# Patient Record
Sex: FEMALE | Race: WHITE | NOT HISPANIC OR LATINO | Employment: PART TIME | URBAN - METROPOLITAN AREA
[De-identification: names, ages, dates, MRNs, and addresses within clinical notes are randomized per-mention and may not be internally consistent; named-entity substitution may affect disease eponyms.]

---

## 2018-05-01 ASSESSMENT — MIFFLIN-ST. JEOR: SCORE: 1190.25

## 2018-05-03 ENCOUNTER — ANESTHESIA - HEALTHEAST (OUTPATIENT)
Dept: SURGERY | Facility: AMBULATORY SURGERY CENTER | Age: 38
End: 2018-05-03

## 2018-05-04 ENCOUNTER — SURGERY - HEALTHEAST (OUTPATIENT)
Dept: SURGERY | Facility: AMBULATORY SURGERY CENTER | Age: 38
End: 2018-05-04

## 2019-06-13 ENCOUNTER — HOSPITAL ENCOUNTER (OUTPATIENT)
Dept: GENERAL RADIOLOGY | Facility: CLINIC | Age: 39
Discharge: HOME | End: 2019-06-13
Attending: OBSTETRICS & GYNECOLOGY | Admitting: OBSTETRICS & GYNECOLOGY
Payer: COMMERCIAL

## 2019-06-13 DIAGNOSIS — Z31.49 INVESTIGATION AND TESTING FOR PROCREATION MANAGEMENT: ICD-10-CM

## 2019-06-13 PROCEDURE — 25500064 ZZH RX 255 OP 636: Performed by: RADIOLOGY

## 2019-06-13 PROCEDURE — 74740 X-RAY FEMALE GENITAL TRACT: CPT

## 2019-06-13 RX ORDER — IOPAMIDOL 510 MG/ML
50 INJECTION, SOLUTION INTRAVASCULAR ONCE
Status: COMPLETED | OUTPATIENT
Start: 2019-06-13 | End: 2019-06-13

## 2019-06-13 RX ADMIN — IOPAMIDOL 5 ML: 510 INJECTION, SOLUTION INTRAVASCULAR at 08:03

## 2019-09-16 ENCOUNTER — INFUSION THERAPY VISIT (OUTPATIENT)
Dept: INFUSION THERAPY | Facility: CLINIC | Age: 39
End: 2019-09-16
Attending: OBSTETRICS & GYNECOLOGY
Payer: COMMERCIAL

## 2019-09-16 VITALS
SYSTOLIC BLOOD PRESSURE: 117 MMHG | RESPIRATION RATE: 16 BRPM | DIASTOLIC BLOOD PRESSURE: 68 MMHG | TEMPERATURE: 97.9 F | HEART RATE: 52 BPM

## 2019-09-16 DIAGNOSIS — O21.1 HYPEREMESIS GRAVIDARUM WITH DEHYDRATION: ICD-10-CM

## 2019-09-16 DIAGNOSIS — O21.1 HYPEREMESIS GRAVIDARUM WITH DEHYDRATION: Primary | ICD-10-CM

## 2019-09-16 PROCEDURE — 96374 THER/PROPH/DIAG INJ IV PUSH: CPT

## 2019-09-16 PROCEDURE — 25800030 ZZH RX IP 258 OP 636: Performed by: OBSTETRICS & GYNECOLOGY

## 2019-09-16 PROCEDURE — 96361 HYDRATE IV INFUSION ADD-ON: CPT

## 2019-09-16 PROCEDURE — 96375 TX/PRO/DX INJ NEW DRUG ADDON: CPT

## 2019-09-16 PROCEDURE — 25000128 H RX IP 250 OP 636: Performed by: OBSTETRICS & GYNECOLOGY

## 2019-09-16 RX ORDER — METOCLOPRAMIDE HYDROCHLORIDE 5 MG/ML
10 INJECTION INTRAMUSCULAR; INTRAVENOUS ONCE
Status: COMPLETED | OUTPATIENT
Start: 2019-09-16 | End: 2019-09-16

## 2019-09-16 RX ORDER — METOCLOPRAMIDE HYDROCHLORIDE 5 MG/ML
10 INJECTION INTRAMUSCULAR; INTRAVENOUS ONCE
Status: CANCELLED
Start: 2019-09-16

## 2019-09-16 RX ORDER — ONDANSETRON 4 MG/1
TABLET, FILM COATED ORAL EVERY 8 HOURS PRN
Status: ON HOLD | COMMUNITY
End: 2020-02-01

## 2019-09-16 RX ORDER — DEXTROSE, SODIUM CHLORIDE, SODIUM LACTATE, POTASSIUM CHLORIDE, AND CALCIUM CHLORIDE 5; .6; .31; .03; .02 G/100ML; G/100ML; G/100ML; G/100ML; G/100ML
1000 INJECTION, SOLUTION INTRAVENOUS ONCE
Status: CANCELLED | OUTPATIENT
Start: 2019-09-18

## 2019-09-16 RX ORDER — DEXTROSE, SODIUM CHLORIDE, SODIUM LACTATE, POTASSIUM CHLORIDE, AND CALCIUM CHLORIDE 5; .6; .31; .03; .02 G/100ML; G/100ML; G/100ML; G/100ML; G/100ML
1000 INJECTION, SOLUTION INTRAVENOUS ONCE
Status: CANCELLED | OUTPATIENT
Start: 2019-09-16

## 2019-09-16 RX ORDER — ONDANSETRON 2 MG/ML
4 INJECTION INTRAMUSCULAR; INTRAVENOUS
Status: COMPLETED | OUTPATIENT
Start: 2019-09-16 | End: 2019-09-16

## 2019-09-16 RX ORDER — ONDANSETRON 2 MG/ML
4 INJECTION INTRAMUSCULAR; INTRAVENOUS
Status: CANCELLED | OUTPATIENT
Start: 2019-09-18

## 2019-09-16 RX ORDER — DEXTROSE, SODIUM CHLORIDE, SODIUM LACTATE, POTASSIUM CHLORIDE, AND CALCIUM CHLORIDE 5; .6; .31; .03; .02 G/100ML; G/100ML; G/100ML; G/100ML; G/100ML
1000 INJECTION, SOLUTION INTRAVENOUS ONCE
Status: COMPLETED | OUTPATIENT
Start: 2019-09-16 | End: 2019-09-16

## 2019-09-16 RX ORDER — ONDANSETRON 2 MG/ML
4 INJECTION INTRAMUSCULAR; INTRAVENOUS
Status: CANCELLED | OUTPATIENT
Start: 2019-09-16

## 2019-09-16 RX ORDER — METOCLOPRAMIDE HYDROCHLORIDE 5 MG/ML
10 INJECTION INTRAMUSCULAR; INTRAVENOUS ONCE
Status: CANCELLED
Start: 2019-09-18

## 2019-09-16 RX ORDER — PRENATAL VIT/IRON FUM/FOLIC AC 27MG-0.8MG
1 TABLET ORAL DAILY
COMMUNITY
End: 2023-10-12

## 2019-09-16 RX ADMIN — SODIUM CHLORIDE, SODIUM LACTATE, POTASSIUM CHLORIDE, CALCIUM CHLORIDE AND DEXTROSE MONOHYDRATE 1000 ML: 5; 600; 310; 30; 20 INJECTION, SOLUTION INTRAVENOUS at 14:01

## 2019-09-16 RX ADMIN — METOCLOPRAMIDE 10 MG: 5 INJECTION, SOLUTION INTRAMUSCULAR; INTRAVENOUS at 13:16

## 2019-09-16 RX ADMIN — ONDANSETRON 4 MG: 2 INJECTION INTRAMUSCULAR; INTRAVENOUS at 14:14

## 2019-09-16 RX ADMIN — SODIUM CHLORIDE, POTASSIUM CHLORIDE, SODIUM LACTATE AND CALCIUM CHLORIDE 1000 ML: 600; 310; 30; 20 INJECTION, SOLUTION INTRAVENOUS at 12:59

## 2019-09-16 ASSESSMENT — PAIN SCALES - GENERAL: PAINLEVEL: NO PAIN (0)

## 2019-09-16 NOTE — PROGRESS NOTES
Infusion Nursing Note:  Brandy Daniel presents today for IVFs, anti-emetics.    Patient seen by provider today: Yes: OB   present during visit today: Not Applicable.    Note: Pt reports extreme nausea, pt is pregnant.  PT had reglan at 1316- some relief reported, pt requested zofran at 2pm due to persistent nausea.    Intravenous Access:  Peripheral IV placed.    Treatment Conditions:  Not Applicable.      Post Infusion Assessment:  Patient tolerated infusion without incident.  Blood return noted pre and post infusion.  Site patent and intact, free from redness, edema or discomfort.  No evidence of extravasations.  Access discontinued per protocol.       Discharge Plan:   Patient declined prescription refills.  Discharge instructions reviewed with: Patient.  Patient and/or family verbalized understanding of discharge instructions and all questions answered.  Copy of AVS reviewed with patient and/or family.  Patient will return 9/18/19 for next appointment.  Patient discharged in stable condition accompanied by: self.  Departure Mode: Ambulatory.    Jackelyn Beard, RN, RN

## 2019-09-18 ENCOUNTER — TELEPHONE (OUTPATIENT)
Dept: INFUSION THERAPY | Facility: CLINIC | Age: 39
End: 2019-09-18

## 2019-10-08 ENCOUNTER — AMBULATORY - HEALTHEAST (OUTPATIENT)
Dept: MATERNAL FETAL MEDICINE | Facility: HOSPITAL | Age: 39
End: 2019-10-08

## 2019-10-08 ENCOUNTER — MEDICAL CORRESPONDENCE (OUTPATIENT)
Dept: HEALTH INFORMATION MANAGEMENT | Facility: CLINIC | Age: 39
End: 2019-10-08

## 2019-10-08 DIAGNOSIS — O26.90 PREGNANCY, ANTEPARTUM, COMPLICATIONS: ICD-10-CM

## 2019-10-08 LAB
ABO + RH BLD: NORMAL
ABO + RH BLD: NORMAL
BLD GP AB SCN SERPL QL: NEGATIVE
C TRACH DNA SPEC QL PROBE+SIG AMP: NEGATIVE
HBV SURFACE AG SERPL QL IA: NEGATIVE
HIV 1+2 AB+HIV1 P24 AG SERPL QL IA: NON REACTIVE
N GONORRHOEA DNA SPEC QL PROBE+SIG AMP: NEGATIVE
RUBELLA ABY IGG: NORMAL
TREPONEMA ANTIBODIES: NORMAL

## 2019-10-11 ENCOUNTER — AMBULATORY - HEALTHEAST (OUTPATIENT)
Dept: MATERNAL FETAL MEDICINE | Facility: HOSPITAL | Age: 39
End: 2019-10-11

## 2019-10-18 ENCOUNTER — RECORDS - HEALTHEAST (OUTPATIENT)
Dept: ULTRASOUND IMAGING | Facility: HOSPITAL | Age: 39
End: 2019-10-18

## 2019-10-18 ENCOUNTER — AMBULATORY - HEALTHEAST (OUTPATIENT)
Dept: LAB | Facility: HOSPITAL | Age: 39
End: 2019-10-18

## 2019-10-18 ENCOUNTER — OFFICE VISIT - HEALTHEAST (OUTPATIENT)
Dept: MATERNAL FETAL MEDICINE | Facility: HOSPITAL | Age: 39
End: 2019-10-18

## 2019-10-18 ENCOUNTER — RECORDS - HEALTHEAST (OUTPATIENT)
Dept: ADMINISTRATIVE | Facility: OTHER | Age: 39
End: 2019-10-18

## 2019-10-18 DIAGNOSIS — O09.521 SUPERVISION OF ELDERLY MULTIGRAVIDA IN FIRST TRIMESTER: ICD-10-CM

## 2019-10-18 DIAGNOSIS — O26.90 PREGNANCY, ANTEPARTUM, COMPLICATIONS: ICD-10-CM

## 2019-10-18 DIAGNOSIS — O09.521 SUPERVISION OF ELDERLY MULTIGRAVIDA, FIRST TRIMESTER: ICD-10-CM

## 2019-10-18 DIAGNOSIS — O09.521 MULTIGRAVIDA OF ADVANCED MATERNAL AGE IN FIRST TRIMESTER: ICD-10-CM

## 2019-10-24 ENCOUNTER — COMMUNICATION - HEALTHEAST (OUTPATIENT)
Dept: MATERNAL FETAL MEDICINE | Facility: HOSPITAL | Age: 39
End: 2019-10-24

## 2019-10-24 LAB — TRISOMY 21,18,13 - HE HISTORICAL: NORMAL

## 2019-10-29 ENCOUNTER — AMBULATORY - HEALTHEAST (OUTPATIENT)
Dept: MATERNAL FETAL MEDICINE | Facility: HOSPITAL | Age: 39
End: 2019-10-29

## 2019-10-29 DIAGNOSIS — O09.529 ANTEPARTUM MULTIGRAVIDA OF ADVANCED MATERNAL AGE: ICD-10-CM

## 2019-10-29 DIAGNOSIS — O09.522 MULTIGRAVIDA OF ADVANCED MATERNAL AGE IN SECOND TRIMESTER: Primary | ICD-10-CM

## 2019-12-09 ENCOUNTER — RECORDS - HEALTHEAST (OUTPATIENT)
Dept: ULTRASOUND IMAGING | Facility: HOSPITAL | Age: 39
End: 2019-12-09

## 2019-12-09 ENCOUNTER — OFFICE VISIT - HEALTHEAST (OUTPATIENT)
Dept: MATERNAL FETAL MEDICINE | Facility: HOSPITAL | Age: 39
End: 2019-12-09

## 2019-12-09 ENCOUNTER — RECORDS - HEALTHEAST (OUTPATIENT)
Dept: ADMINISTRATIVE | Facility: OTHER | Age: 39
End: 2019-12-09

## 2019-12-09 DIAGNOSIS — O09.529 SUPERVISION OF ELDERLY MULTIGRAVIDA, UNSPECIFIED TRIMESTER: ICD-10-CM

## 2019-12-09 DIAGNOSIS — O09.522 MULTIGRAVIDA OF ADVANCED MATERNAL AGE IN SECOND TRIMESTER: ICD-10-CM

## 2020-02-01 ENCOUNTER — HOSPITAL ENCOUNTER (INPATIENT)
Facility: CLINIC | Age: 40
LOS: 2 days | Discharge: HOME | DRG: 833 | End: 2020-02-03
Attending: OBSTETRICS & GYNECOLOGY | Admitting: OBSTETRICS & GYNECOLOGY
Payer: COMMERCIAL

## 2020-02-01 PROBLEM — Z36.89 ENCOUNTER FOR TRIAGE IN PREGNANT PATIENT: Status: ACTIVE | Noted: 2020-02-01

## 2020-02-01 LAB
ALBUMIN UR-MCNC: NEGATIVE MG/DL
APPEARANCE UR: CLEAR
BILIRUB UR QL STRIP: NEGATIVE
COLOR UR AUTO: ABNORMAL
FIBRONECTIN FETAL VAG QL: NEGATIVE
GLUCOSE UR STRIP-MCNC: NEGATIVE MG/DL
HGB UR QL STRIP: NEGATIVE
KETONES UR STRIP-MCNC: 10 MG/DL
LEUKOCYTE ESTERASE UR QL STRIP: NEGATIVE
NITRATE UR QL: NEGATIVE
PH UR STRIP: 7 PH (ref 5–7)
RBC #/AREA URNS AUTO: <1 /HPF (ref 0–2)
SOURCE: ABNORMAL
SP GR UR STRIP: 1.01 (ref 1–1.03)
SQUAMOUS #/AREA URNS AUTO: 1 /HPF (ref 0–1)
UROBILINOGEN UR STRIP-MCNC: NORMAL MG/DL (ref 0–2)
WBC #/AREA URNS AUTO: <1 /HPF (ref 0–5)

## 2020-02-01 PROCEDURE — 82731 ASSAY OF FETAL FIBRONECTIN: CPT | Performed by: OBSTETRICS & GYNECOLOGY

## 2020-02-01 PROCEDURE — 25000132 ZZH RX MED GY IP 250 OP 250 PS 637

## 2020-02-01 PROCEDURE — G0463 HOSPITAL OUTPT CLINIC VISIT: HCPCS

## 2020-02-01 PROCEDURE — 12000000 ZZH R&B MED SURG/OB

## 2020-02-01 PROCEDURE — 25000132 ZZH RX MED GY IP 250 OP 250 PS 637: Performed by: OBSTETRICS & GYNECOLOGY

## 2020-02-01 PROCEDURE — 81001 URINALYSIS AUTO W/SCOPE: CPT | Performed by: OBSTETRICS & GYNECOLOGY

## 2020-02-01 RX ORDER — NIFEDIPINE 10 MG/1
CAPSULE ORAL
Status: DISCONTINUED
Start: 2020-02-01 | End: 2020-02-02 | Stop reason: HOSPADM

## 2020-02-01 RX ORDER — CALCIUM GLUCONATE 94 MG/ML
1 INJECTION, SOLUTION INTRAVENOUS
Status: DISCONTINUED | OUTPATIENT
Start: 2020-02-01 | End: 2020-02-03 | Stop reason: HOSPADM

## 2020-02-01 RX ORDER — NIFEDIPINE 10 MG/1
20 CAPSULE ORAL ONCE
Status: COMPLETED | OUTPATIENT
Start: 2020-02-01 | End: 2020-02-01

## 2020-02-01 RX ORDER — NIFEDIPINE 10 MG/1
CAPSULE ORAL
Status: COMPLETED
Start: 2020-02-01 | End: 2020-02-01

## 2020-02-01 RX ORDER — MAGNESIUM SULFATE HEPTAHYDRATE 40 MG/ML
4 INJECTION, SOLUTION INTRAVENOUS ONCE
Status: COMPLETED | OUTPATIENT
Start: 2020-02-01 | End: 2020-02-02

## 2020-02-01 RX ORDER — NIFEDIPINE 10 MG/1
20 CAPSULE ORAL EVERY 30 MIN PRN
Status: COMPLETED | OUTPATIENT
Start: 2020-02-01 | End: 2020-02-01

## 2020-02-01 RX ORDER — MAGNESIUM SULFATE IN WATER 40 MG/ML
2 INJECTION, SOLUTION INTRAVENOUS CONTINUOUS
Status: DISPENSED | OUTPATIENT
Start: 2020-02-02 | End: 2020-02-02

## 2020-02-01 RX ORDER — NIFEDIPINE 10 MG/1
20 CAPSULE ORAL EVERY 6 HOURS SCHEDULED
Status: DISCONTINUED | OUTPATIENT
Start: 2020-02-02 | End: 2020-02-01

## 2020-02-01 RX ORDER — BETAMETHASONE SODIUM PHOSPHATE AND BETAMETHASONE ACETATE 3; 3 MG/ML; MG/ML
12 INJECTION, SUSPENSION INTRA-ARTICULAR; INTRALESIONAL; INTRAMUSCULAR; SOFT TISSUE EVERY 24 HOURS
Status: COMPLETED | OUTPATIENT
Start: 2020-02-01 | End: 2020-02-02

## 2020-02-01 RX ADMIN — NIFEDIPINE 20 MG: 10 CAPSULE ORAL at 21:10

## 2020-02-01 RX ADMIN — NIFEDIPINE 20 MG: 10 CAPSULE ORAL at 23:05

## 2020-02-01 RX ADMIN — NIFEDIPINE 20 MG: 10 CAPSULE ORAL at 21:42

## 2020-02-01 ASSESSMENT — MIFFLIN-ST. JEOR: SCORE: 1177.58

## 2020-02-02 ENCOUNTER — APPOINTMENT (OUTPATIENT)
Dept: ULTRASOUND IMAGING | Facility: CLINIC | Age: 40
DRG: 833 | End: 2020-02-02
Attending: OBSTETRICS & GYNECOLOGY
Payer: COMMERCIAL

## 2020-02-02 VITALS
TEMPERATURE: 97.8 F | SYSTOLIC BLOOD PRESSURE: 113 MMHG | HEIGHT: 55 IN | DIASTOLIC BLOOD PRESSURE: 55 MMHG | RESPIRATION RATE: 16 BRPM | BODY MASS INDEX: 35.87 KG/M2 | WEIGHT: 155 LBS

## 2020-02-02 PROCEDURE — 76805 OB US >/= 14 WKS SNGL FETUS: CPT

## 2020-02-02 PROCEDURE — 25800030 ZZH RX IP 258 OP 636: Performed by: OBSTETRICS & GYNECOLOGY

## 2020-02-02 PROCEDURE — 25000132 ZZH RX MED GY IP 250 OP 250 PS 637: Performed by: OBSTETRICS & GYNECOLOGY

## 2020-02-02 PROCEDURE — 25000128 H RX IP 250 OP 636: Performed by: OBSTETRICS & GYNECOLOGY

## 2020-02-02 PROCEDURE — 12000035 ZZH R&B POSTPARTUM

## 2020-02-02 RX ORDER — SODIUM CHLORIDE, SODIUM LACTATE, POTASSIUM CHLORIDE, CALCIUM CHLORIDE 600; 310; 30; 20 MG/100ML; MG/100ML; MG/100ML; MG/100ML
INJECTION, SOLUTION INTRAVENOUS CONTINUOUS
Status: DISCONTINUED | OUTPATIENT
Start: 2020-02-02 | End: 2020-02-03 | Stop reason: HOSPADM

## 2020-02-02 RX ORDER — DIPHENHYDRAMINE HCL 25 MG
25 CAPSULE ORAL
Status: DISCONTINUED | OUTPATIENT
Start: 2020-02-02 | End: 2020-02-03 | Stop reason: HOSPADM

## 2020-02-02 RX ADMIN — MAGNESIUM SULFATE IN WATER 2 G/HR: 40 INJECTION, SOLUTION INTRAVENOUS at 00:53

## 2020-02-02 RX ADMIN — BETAMETHASONE SODIUM PHOSPHATE AND BETAMETHASONE ACETATE 12 MG: 3; 3 INJECTION, SUSPENSION INTRA-ARTICULAR; INTRALESIONAL; INTRAMUSCULAR at 23:50

## 2020-02-02 RX ADMIN — DIPHENHYDRAMINE HYDROCHLORIDE 25 MG: 25 CAPSULE ORAL at 01:32

## 2020-02-02 RX ADMIN — SODIUM CHLORIDE, POTASSIUM CHLORIDE, SODIUM LACTATE AND CALCIUM CHLORIDE: 600; 310; 30; 20 INJECTION, SOLUTION INTRAVENOUS at 11:26

## 2020-02-02 RX ADMIN — MAGNESIUM SULFATE HEPTAHYDRATE 4 G: 40 INJECTION, SOLUTION INTRAVENOUS at 00:18

## 2020-02-02 RX ADMIN — MAGNESIUM SULFATE IN WATER 2 G/HR: 40 INJECTION, SOLUTION INTRAVENOUS at 11:24

## 2020-02-02 RX ADMIN — BETAMETHASONE SODIUM PHOSPHATE AND BETAMETHASONE ACETATE 12 MG: 3; 3 INJECTION, SUSPENSION INTRA-ARTICULAR; INTRALESIONAL; INTRAMUSCULAR at 00:13

## 2020-02-02 NOTE — H&P
"Brandy Daniel is a 39 year old  @ 27w1d who presents with 3 days of lower back pain that felt musculoskeletal, then developed last night into contractions. While in triage, the contractions increased in frequency and intensity to every 2-3 minutes. She denies discharge, leaking of fluid, vaginal bleeding, fever, dysuria. She notes good fetal movement. She was started on nifedipine, received 3 doses, but noted contractions were persisting. The nifedipine was then discontinued and she was started on magnesium sulfate. Since starting magnesium, her contractions have become less intense and less frequent, although they have persisted every 6-7 minutes.    PMH: None  PSH: Breast augmentation, breast reduction  All: Cephalexin  OB: FT  x 2, new partner for this pregnancy  SH: NICU nurse at [employer]    PE: /56   Temp 97.9  F (36.6  C) (Temporal)   Resp 16   Ht 1.321 m (4' 4\")   Wt 70.3 kg (155 lb)   BMI 40.30 kg/m     NAD  Abd Soft, NT  Ext NT, no edema  SVE: Closed/long/firm/-4, posterior  FFN NEGATIVE  UA negative  [place]: Q6-7 minutes  FHT: 120s, mod variability, no decels, +accels    A/P: 39 year old  @ 27w1d with  contractions, negative FFN and no cervical change.   - s/p BMZ #1, will receive #2 tonight at 0000  - Will continue magnesium sulfate for 12 hours, discontinue at 2PM, then monitor contractions  - US growth and fluid this afternoon  - If stable tonight, she desires discharge to home after BMZ #2. Recommend f/u in 2 days in clinic.  "

## 2020-02-02 NOTE — PLAN OF CARE
Pt admitted to labor and delivery c/o  labor. Pt received 3 doses of procardia and started on Mag. Betamethasone x 1 dose. VSS on RA. C/o mild headache, hot flushes, using ice and fan for comfort. Benadryl given, but not able to sleep though the night. Ctx 2-6 min apart, and palpate mild. FHT category 1. Pt on bedrest with bathroom privileges. Voiding without difficulty. Significant other supportive at bedside. Pt anxious, restless, and verbalized to RN she suspects she will have post partum depression after this pregnancy. Will put in social work consult. Will continue to monitor per POC.

## 2020-02-02 NOTE — PROVIDER NOTIFICATION
02/01/20 2324   Provider Notification   Provider Name/Title Dr HECTOR Dorsey   Method of Notification Electronic Page   Request Evaluate - Remote   Notification Reason Other (Comment);SVE  (Text message)     Text message as follows:    Brandy Long thick & closed but soft.  Call if you want beta given. Isra

## 2020-02-02 NOTE — PLAN OF CARE
Pt called c/o moderate to strong ctx. Breathing through ctx, coached on slowing breathing. Pt states she feels her bladder is full. Pt encouraged to void. Pt encouraged to call if ctx still uncomfortable after emptying bladder. Will continue to monitor per POC.

## 2020-02-02 NOTE — PLAN OF CARE
Patient returns from ultrasound via wheelchair.  She notes feeling occasional cramps, denies them as painful.  She notes that her headache has been gone for hours, but she feels flushed in her face/head from the magnesium sulfate (ice pack helps).  She reports feeling tired.  She denies leaking fluid or vaginal bleeding.  She reports +fetal movement.      Will plan to update Dr. Dorsey around 1400 when magnesium sulfate is turned off.  Continuous external monitoring continues.  Pt orally hydrating and eating.  Her significant other has been supportive at her bedside.

## 2020-02-02 NOTE — PROVIDER NOTIFICATION
02/01/20 2300   Provider Notification   Provider Name/Title Dr HECTOR Dorsey   Method of Notification Phone   Request Evaluate - Remote   Notification Reason Uterine Activity     2300  Dr HCETOR Dorsey paged with return call received.  Update included but not limited to:  Contraction pattern and intensity as palpated by RN after two doses of nifedipine at 2110 & 2142.  Patient continues to accurately report frequency and duration of contractions.    Plan per provider administer third dose of 20 mg nifedipine now then start nifedipine 20 mg every 6 hours with first dose at 0400.  Check cervix if change then give betamethasone.

## 2020-02-02 NOTE — PLAN OF CARE
2343  External monitors off patient to room 230 via wheelchair.  Report to Shameka WANG RN care transferred.

## 2020-02-02 NOTE — PLAN OF CARE
2110  First dose of Nifedipine administered per orders.  Patient educated prior to administration.      2142  Second dose of nifedipine administered.  Contractions continue to palpate mild.      2300  Patient reports contractions had spaced out and felt less intense.  Now are increasing in intensity and frequency.  Continue to palpate mild.

## 2020-02-02 NOTE — PROVIDER NOTIFICATION
02/01/20 2102   Provider Notification   Provider Name/Title Dr HECTOR Dorsey   Method of Notification Phone   Request Evaluate - Remote   Notification Reason Uterine Activity;SVE;Other (Comment)  (FFN not resulted yet)     2102  Dr HECTOR Dorsey paged with return call received.  Update included but not limited to:  FFN not resulted yet, however cervix is long, thick, and closed.  Uterine contractions continuing every 2-3 minutes and palpating mild, patient now breathing with them.  Patient given marker and is marking beginning of contractions that is consistent with monitor and RNs palpation.      Plan per provider Nifedipine 20mg orally now may repeat twice for a total of 3 doses to stop contractions.  Check blood pressures per orders and notify per orders.

## 2020-02-02 NOTE — PROVIDER NOTIFICATION
02/01/20 2330   Provider Notification   Provider Name/Title Dr HECTOR Dorsey   Method of Notification Phone   Request Evaluate - Remote   Notification Reason Uterine Activity;SVE     2330  Dr HECTOR Dorsey paged with return call received.  Update included but not limited to:  Continued uterine activity that patient continues to feel, reviewed SVE.    Plan per provider start magnesium 4 gram load, 2 gram per hour.  Discontinue nifedipine.  Administer betamethasone.

## 2020-02-02 NOTE — PLAN OF CARE
1921  Patient arrived to maternal assessment center ambulatory with significant other, Donte.    Patient reports reason for visit is backache for 3 days, now abdominal cramping that has moved to possible contractions.  Patient to bathroom to void and leave urine sample then to MAC room to change into gown.      G 4 P 2     27 weeks 0 days gestation    Prenatal record reviewed.        Verbal consent for EFM.     EFM applied for fetal well being with uterine contractions.    Uterine assessment completed, non-tender and palpates soft between contractions.      Triage/Arrival assessment completed.     Patient reports fetal movement is present.  Patient denies pelvic pressure,backache, UTI symptoms, GI problems, bloody show, vaginal bleeding, edema, headache, visual disturbances, epigastric or URQ pain, and/or rupture of membranes.

## 2020-02-02 NOTE — PROVIDER NOTIFICATION
02/02/20 0600   Provider Notification   Provider Name/Title Dr HECTOR Dorsey   Method of Notification Electronic Page   Notification Reason Status Update   Dr HECTOR Dorsey returned phone call @ 0605. Update given on frequency of ctx. Pt feeling ctx and rating them stronger then before. New orders received: will continue to monitor pt for another hour, if ctx remain 3-5 min and getting stronger, will perform SVE. Will continue to monitor per POC.

## 2020-02-02 NOTE — PLAN OF CARE
Pt transferred to room 230 from MAC via wheel chair @ 2350. Oriented to unit, room and call light system. External toco and us applied with pt's verbal consent. VSS on RA ex tachycardic. Pt appears flushed, co mild headache. Reflexes +3, no clonus. IV placed and mag started.  POC reviewed and pt and support person, questions encouraged and answered, verbalize understanding. Will continue to monitor per POC.

## 2020-02-02 NOTE — PROVIDER NOTIFICATION
20   Provider Notification   Provider Name/Title Dr HECTOR Dorsey   Method of Notification Phone   Request Evaluate - Remote   Notification Reason Patient Arrived;Uterine Activity;Pain;Maternal Vital Sign Change;Other (Comment)  (UA result)       Dr HECTOR Dorsey paged with return call received.  Update included but not limited to:  Patient arrival.  Patient is a  27 0/7 gestation that presents complaining of abdominal cramping that has turned into contractions.  Patient rates abdominal cramping a 2-3 on 0-10 pain scale as uncomfortable tightening.  However backpain is an 8 when she moves.  Patient states she has never had aleja roberts contractions with her other two pregnancies (children are 8 and 10 years old.  Contractions via external tocotransducer and RN palpation every 2-3 minutes consistent with patient report of frequency and duration.  Contractions palpate mild by RN.   Fetal heart tones appropriate for gestation with a 140 baseline.  Maternal blood pressure on arrival 131/71. Came down to 128/65.  Noted BPs on prenatal 110s/60s.  No clonus normal reflexes.  Patient reports some increase in swelling at end of day.  UA collected and sent - abnormal result of ketones=10, otherwise specific Gravity = 1.006 and pH = 7.0.    Plan per provider Collect and send FFN and do SVE.  Notify with results.

## 2020-02-02 NOTE — PROVIDER NOTIFICATION
02/02/20 0125   Provider Notification   Provider Name/Title Dr HECTOR Dorsey   Method of Notification Electronic Page;Phone   Notification Reason Patient Request   Dr Dorsey paged per pt request for orders for sleeping pill. Orders for Benadryl received, see MAR.

## 2020-02-02 NOTE — PLAN OF CARE
2015  FFN collected.  SVE closed/thick/-4/posterior.      2030  Patient given marker.  Instructed to matthew when she starts to feel contraction.      2040  RN at bedside palpating contractions.  Contractions continue to palpate mild.  Patient marking every contraction palpated by RN and traced by external tocotransducer.

## 2020-02-02 NOTE — PLAN OF CARE
Magnesium Sulfate/Lactated Ringers stopped at 1403; IV saline locked.  Pt reports feeling 1 ctx per hour on average now; external monitoring shows the same. She denies any other changes.  She notes +FM.  Updated Dr. Dorsey with ultrasound results and pt's status/monitoring.  Ok to remove external fetal monitor; will obtain NST during the next shift.  Pt in agreement.      Pt plans to nap; she didn't sleep all night.  VSS.  Instructed pt to call when awake; pt in agreement.  Report given to JANELLE Sotelo at 1500.

## 2020-02-03 ENCOUNTER — HOSPITAL ENCOUNTER (INPATIENT)
Facility: CLINIC | Age: 40
End: 2020-02-03
Admitting: OBSTETRICS & GYNECOLOGY
Payer: COMMERCIAL

## 2020-02-03 NOTE — DISCHARGE INSTRUCTIONS
Discharge Instruction for Undelivered Patients      You were seen for:  labor evalutation  We Consulted: Dr. ASTRID Dorsey  You had (Test or Medicine):inpatient treatment for PTL,fetal and uterine monitoring, SVE, Nifedipine 3 doses then Magnesium Sulfate, Betamethasone 2 doses    Diet:   As tolerated  Drink 8 to 12 glasses of liquids (milk, juice, water) every day.  You may eat meals and snacks.     Activity:  Take it easy until seen by provider.  No heavy lifting or exercise.  Fetal kick counts once a day.  10 movements in 1 hour during the babies active time.     Call your provider if you notice:  Swelling in your face or increased swelling in your hands or legs.  Headaches that are not relieved by Tylenol (acetaminophen).  Changes in your vision (blurring: seeing spots or stars.)  Nausea (sick to your stomach) and vomiting (throwing up).   Weight gain of 5 pounds or more per week.  Heartburn that doesn't go away.  Signs of bladder infection: pain when you urinate (use the toilet), need to go more often and more urgently.  The bag of lopez (rupture of membranes) breaks, or you notice leaking in your underwear.  Bright red blood in your underwear.  Abdominal (lower belly) or stomach pain.  *If less than 34 weeks: Contractions (tightenings) more than 6 times in one hour.  Increase or change in vaginal discharge (note the color and amount)      Follow-up:  Follow up with provider on Tuesday in the office.

## 2020-02-03 NOTE — PLAN OF CARE
Betamethasone given, NST done, discharge instructions and document given. Follow up with provider in 2 days Patient left with good disposition

## 2020-02-03 NOTE — PLAN OF CARE
Pt's felt 4 contractions since the Magnesium sulfate was stopped.  Stated that it felt like a tightening.  Baby active.  Discussed staying hydrated and frequent voiding.  Reassurance given that the contractions that she had had when first admitted were not strong enough to change her cervix when D checked it today.

## 2020-02-06 ENCOUNTER — OFFICE VISIT (OUTPATIENT)
Dept: FAMILY MEDICINE | Facility: CLINIC | Age: 40
End: 2020-02-06
Payer: COMMERCIAL

## 2020-02-06 VITALS
WEIGHT: 159 LBS | BODY MASS INDEX: 41.34 KG/M2 | HEART RATE: 84 BPM | TEMPERATURE: 98.3 F | DIASTOLIC BLOOD PRESSURE: 60 MMHG | RESPIRATION RATE: 16 BRPM | OXYGEN SATURATION: 99 % | SYSTOLIC BLOOD PRESSURE: 112 MMHG

## 2020-02-06 DIAGNOSIS — J20.9 ACUTE BRONCHITIS WITH SYMPTOMS > 10 DAYS: Primary | ICD-10-CM

## 2020-02-06 DIAGNOSIS — Z33.1 PREGNANT STATE, INCIDENTAL: ICD-10-CM

## 2020-02-06 PROCEDURE — 99213 OFFICE O/P EST LOW 20 MIN: CPT | Performed by: FAMILY MEDICINE

## 2020-02-06 RX ORDER — AZITHROMYCIN 250 MG/1
TABLET, FILM COATED ORAL
Qty: 6 TABLET | Refills: 0 | Status: ON HOLD | OUTPATIENT
Start: 2020-02-06 | End: 2020-04-25

## 2020-02-06 NOTE — PROGRESS NOTES
Subjective     Brandy Daniel is a 39 year old female  at 27+ 5 weeks GA who presents to clinic today for the following health issues:    HPI   Acute Illness   Acute illness concerns: cough  Onset: x 11 days    Fever: no    Chills/Sweats: YES    Headache (location?): no    Sinus Pressure:YES    Conjunctivitis:  no    Ear Pain: no    Rhinorrhea: no    Congestion: YES    Sore Throat: no     Cough: YES - feels wet but not able to bring anythingup    Wheeze: no    Decreased Appetite: YES    Nausea: no    Vomiting: no    Diarrhea:  no    Dysuria/Freq.: no    Fatigue/Achiness: YES    Sick/Strep Exposure: YES- son had pneumonia - missed 2 weeks of school in early January     Therapies Tried and outcome:VIcks tylenol humidifier     Has had mild nasal/sinus congestion but more severe cough.  Cough is not improving, is keeping her up at night, is causing great fatigue.   She's discouraged because she can't take OTC cough medicine due to pregnancy.  She has been drinking warm water, taking honey, and treating with steam (long showers).    She's worried she may have pneumonia - possibly aspiration pneumonia as she has also been having severe GERD with current pregnancy.      BP Readings from Last 3 Encounters:   20 112/60   20 113/55   19 117/68    Wt Readings from Last 3 Encounters:   20 72.1 kg (159 lb)   20 70.3 kg (155 lb)   12 63.5 kg (140 lb)             Reviewed and updated as needed this visit by Provider  Meds  Problems         Review of Systems   ROS COMP: Constitutional, HEENT, cardiovascular, pulmonary, gi and gu systems are negative, except as otherwise noted.      Objective    /60 (BP Location: Left arm, Patient Position: Sitting, Cuff Size: Adult Regular)   Pulse 84   Temp 98.3  F (36.8  C) (Oral)   Resp 16   Wt 72.1 kg (159 lb)   SpO2 99%   Breastfeeding No   BMI 41.34 kg/m    Body mass index is 41.34 kg/m .  Physical Exam   GEN:  no apparent distress,  appears fatigued  EYES: sclerae and conjunctivae clear with no discharge  ENT: external ears and nose without lesions or scars, TM's and canals clear bilaterally and oropharynx clear with moist mucus membranes and normal landmarks  NECK:  Supple without adenopathy, mass, or thyromegaly  LUNGS:  normal respiratory effort, and lungs clear to auscultation bilaterally - no rales, rhonchi or wheezes  CV: regular rate and rhythm, normal S1 S2, no S3 or S4 and no murmur, click or rub   SKIN: Clear. No significant rash, abnormal pigmentation or lesions   ABD: gravid    Diagnostic Test Results:  Labs reviewed in Epic        Assessment & Plan       ICD-10-CM    1. Acute bronchitis with symptoms > 10 days J20.9 azithromycin (ZITHROMAX) 250 MG tablet   2. Pregnant state, incidental Z33.1      With no fever and normal lung exam pneumonia is unlikely.  I'd prefer to avoid CXR given her pregnancy status.  Given duration of symptoms I did offer her option of an empiric course of azithromycin and reviewed that this is pregnancy class B.  She was agreeable to the zpak.  She'll continue to avoid OTC cold/cough preparations - only acetaminophen.        No follow-ups on file.    eKri Dudley MD  [facility]

## 2020-02-12 ENCOUNTER — OFFICE VISIT - HEALTHEAST (OUTPATIENT)
Dept: FAMILY MEDICINE | Facility: CLINIC | Age: 40
End: 2020-02-12

## 2020-02-12 DIAGNOSIS — R07.89 CHEST WALL PAIN: ICD-10-CM

## 2020-04-10 LAB — GROUP B STREP PCR: NORMAL

## 2020-04-25 ENCOUNTER — HOSPITAL ENCOUNTER (INPATIENT)
Facility: CLINIC | Age: 40
LOS: 1 days | Discharge: HOME HEALTH | End: 2020-04-26
Attending: OBSTETRICS & GYNECOLOGY | Admitting: OBSTETRICS & GYNECOLOGY
Payer: COMMERCIAL

## 2020-04-25 ENCOUNTER — ANESTHESIA (OUTPATIENT)
Dept: OBGYN | Facility: CLINIC | Age: 40
End: 2020-04-25
Payer: COMMERCIAL

## 2020-04-25 ENCOUNTER — ANESTHESIA EVENT (OUTPATIENT)
Dept: OBGYN | Facility: CLINIC | Age: 40
End: 2020-04-25
Payer: COMMERCIAL

## 2020-04-25 PROBLEM — Z34.90 TERM PREGNANCY: Status: ACTIVE | Noted: 2020-04-25

## 2020-04-25 LAB
ABO + RH BLD: NORMAL
ABO + RH BLD: NORMAL
BASOPHILS # BLD AUTO: 0 10E9/L (ref 0–0.2)
BASOPHILS NFR BLD AUTO: 0.2 %
DIFFERENTIAL METHOD BLD: NORMAL
EOSINOPHIL # BLD AUTO: 0.1 10E9/L (ref 0–0.7)
EOSINOPHIL NFR BLD AUTO: 0.7 %
ERYTHROCYTE [DISTWIDTH] IN BLOOD BY AUTOMATED COUNT: 14.4 % (ref 10–15)
HCT VFR BLD AUTO: 36.5 % (ref 35–47)
HGB BLD-MCNC: 12 G/DL (ref 11.7–15.7)
IMM GRANULOCYTES # BLD: 0 10E9/L (ref 0–0.4)
IMM GRANULOCYTES NFR BLD: 0.4 %
LYMPHOCYTES # BLD AUTO: 2.9 10E9/L (ref 0.8–5.3)
LYMPHOCYTES NFR BLD AUTO: 30.2 %
MCH RBC QN AUTO: 28.6 PG (ref 26.5–33)
MCHC RBC AUTO-ENTMCNC: 32.9 G/DL (ref 31.5–36.5)
MCV RBC AUTO: 87 FL (ref 78–100)
MONOCYTES # BLD AUTO: 1.1 10E9/L (ref 0–1.3)
MONOCYTES NFR BLD AUTO: 11.6 %
NEUTROPHILS # BLD AUTO: 5.4 10E9/L (ref 1.6–8.3)
NEUTROPHILS NFR BLD AUTO: 56.9 %
NRBC # BLD AUTO: 0 10*3/UL
NRBC BLD AUTO-RTO: 0 /100
PLATELET # BLD AUTO: 203 10E9/L (ref 150–450)
RBC # BLD AUTO: 4.19 10E12/L (ref 3.8–5.2)
SARS-COV-2 PCR COMMENT: NORMAL
SARS-COV-2 RNA SPEC QL NAA+PROBE: NEGATIVE
SARS-COV-2 RNA SPEC QL NAA+PROBE: NORMAL
SPECIMEN EXP DATE BLD: NORMAL
SPECIMEN SOURCE: NORMAL
SPECIMEN SOURCE: NORMAL
WBC # BLD AUTO: 9.4 10E9/L (ref 4–11)

## 2020-04-25 PROCEDURE — 25000125 ZZHC RX 250: Performed by: SURGERY

## 2020-04-25 PROCEDURE — 37000011 ZZH ANESTHESIA WARD SERVICE

## 2020-04-25 PROCEDURE — 85025 COMPLETE CBC W/AUTO DIFF WBC: CPT | Performed by: OBSTETRICS & GYNECOLOGY

## 2020-04-25 PROCEDURE — 25000125 ZZHC RX 250: Performed by: OBSTETRICS & GYNECOLOGY

## 2020-04-25 PROCEDURE — 10907ZC DRAINAGE OF AMNIOTIC FLUID, THERAPEUTIC FROM PRODUCTS OF CONCEPTION, VIA NATURAL OR ARTIFICIAL OPENING: ICD-10-PCS | Performed by: OBSTETRICS & GYNECOLOGY

## 2020-04-25 PROCEDURE — 86901 BLOOD TYPING SEROLOGIC RH(D): CPT | Performed by: OBSTETRICS & GYNECOLOGY

## 2020-04-25 PROCEDURE — 25800030 ZZH RX IP 258 OP 636: Performed by: SURGERY

## 2020-04-25 PROCEDURE — 86900 BLOOD TYPING SEROLOGIC ABO: CPT | Performed by: OBSTETRICS & GYNECOLOGY

## 2020-04-25 PROCEDURE — 25800030 ZZH RX IP 258 OP 636: Performed by: OBSTETRICS & GYNECOLOGY

## 2020-04-25 PROCEDURE — 0HQ9XZZ REPAIR PERINEUM SKIN, EXTERNAL APPROACH: ICD-10-PCS | Performed by: OBSTETRICS & GYNECOLOGY

## 2020-04-25 PROCEDURE — 25000128 H RX IP 250 OP 636: Performed by: SURGERY

## 2020-04-25 PROCEDURE — 72200001 ZZH LABOR CARE VAGINAL DELIVERY SINGLE

## 2020-04-25 PROCEDURE — 36415 COLL VENOUS BLD VENIPUNCTURE: CPT | Performed by: OBSTETRICS & GYNECOLOGY

## 2020-04-25 PROCEDURE — 3E0R3BZ INTRODUCTION OF ANESTHETIC AGENT INTO SPINAL CANAL, PERCUTANEOUS APPROACH: ICD-10-PCS | Performed by: SURGERY

## 2020-04-25 PROCEDURE — 86780 TREPONEMA PALLIDUM: CPT | Performed by: OBSTETRICS & GYNECOLOGY

## 2020-04-25 PROCEDURE — 25000132 ZZH RX MED GY IP 250 OP 250 PS 637: Performed by: OBSTETRICS & GYNECOLOGY

## 2020-04-25 PROCEDURE — 12000035 ZZH R&B POSTPARTUM

## 2020-04-25 PROCEDURE — 87635 SARS-COV-2 COVID-19 AMP PRB: CPT | Performed by: OBSTETRICS & GYNECOLOGY

## 2020-04-25 PROCEDURE — 00HU33Z INSERTION OF INFUSION DEVICE INTO SPINAL CANAL, PERCUTANEOUS APPROACH: ICD-10-PCS | Performed by: SURGERY

## 2020-04-25 RX ORDER — VITAMIN B COMPLEX
50 TABLET ORAL DAILY
Status: DISCONTINUED | OUTPATIENT
Start: 2020-04-26 | End: 2020-04-26 | Stop reason: HOSPADM

## 2020-04-25 RX ORDER — OXYTOCIN/0.9 % SODIUM CHLORIDE 30/500 ML
100-340 PLASTIC BAG, INJECTION (ML) INTRAVENOUS CONTINUOUS PRN
Status: COMPLETED | OUTPATIENT
Start: 2020-04-25 | End: 2020-04-25

## 2020-04-25 RX ORDER — TRANEXAMIC ACID 10 MG/ML
1 INJECTION, SOLUTION INTRAVENOUS EVERY 30 MIN PRN
Status: DISCONTINUED | OUTPATIENT
Start: 2020-04-25 | End: 2020-04-26 | Stop reason: HOSPADM

## 2020-04-25 RX ORDER — NALOXONE HYDROCHLORIDE 0.4 MG/ML
.1-.4 INJECTION, SOLUTION INTRAMUSCULAR; INTRAVENOUS; SUBCUTANEOUS
Status: DISCONTINUED | OUTPATIENT
Start: 2020-04-25 | End: 2020-04-25

## 2020-04-25 RX ORDER — TRANEXAMIC ACID 10 MG/ML
1 INJECTION, SOLUTION INTRAVENOUS EVERY 30 MIN PRN
Status: DISCONTINUED | OUTPATIENT
Start: 2020-04-25 | End: 2020-04-25

## 2020-04-25 RX ORDER — ACETAMINOPHEN 325 MG/1
650 TABLET ORAL EVERY 4 HOURS PRN
Status: DISCONTINUED | OUTPATIENT
Start: 2020-04-25 | End: 2020-04-26 | Stop reason: HOSPADM

## 2020-04-25 RX ORDER — OXYTOCIN 10 [USP'U]/ML
10 INJECTION, SOLUTION INTRAMUSCULAR; INTRAVENOUS
Status: DISCONTINUED | OUTPATIENT
Start: 2020-04-25 | End: 2020-04-25

## 2020-04-25 RX ORDER — ONDANSETRON 2 MG/ML
4 INJECTION INTRAMUSCULAR; INTRAVENOUS EVERY 6 HOURS PRN
Status: DISCONTINUED | OUTPATIENT
Start: 2020-04-25 | End: 2020-04-25

## 2020-04-25 RX ORDER — OXYTOCIN 10 [USP'U]/ML
10 INJECTION, SOLUTION INTRAMUSCULAR; INTRAVENOUS
Status: DISCONTINUED | OUTPATIENT
Start: 2020-04-25 | End: 2020-04-26 | Stop reason: HOSPADM

## 2020-04-25 RX ORDER — ROPIVACAINE HYDROCHLORIDE 2 MG/ML
10 INJECTION, SOLUTION EPIDURAL; INFILTRATION; PERINEURAL ONCE
Status: COMPLETED | OUTPATIENT
Start: 2020-04-25 | End: 2020-04-25

## 2020-04-25 RX ORDER — OXYCODONE AND ACETAMINOPHEN 5; 325 MG/1; MG/1
1 TABLET ORAL
Status: DISCONTINUED | OUTPATIENT
Start: 2020-04-25 | End: 2020-04-25

## 2020-04-25 RX ORDER — MODIFIED LANOLIN
OINTMENT (GRAM) TOPICAL
Status: DISCONTINUED | OUTPATIENT
Start: 2020-04-25 | End: 2020-04-26 | Stop reason: HOSPADM

## 2020-04-25 RX ORDER — CARBOPROST TROMETHAMINE 250 UG/ML
250 INJECTION, SOLUTION INTRAMUSCULAR
Status: DISCONTINUED | OUTPATIENT
Start: 2020-04-25 | End: 2020-04-25

## 2020-04-25 RX ORDER — ONDANSETRON 4 MG/1
4 TABLET, ORALLY DISINTEGRATING ORAL EVERY 6 HOURS PRN
Status: DISCONTINUED | OUTPATIENT
Start: 2020-04-25 | End: 2020-04-25

## 2020-04-25 RX ORDER — EPHEDRINE SULFATE 50 MG/ML
5 INJECTION, SOLUTION INTRAMUSCULAR; INTRAVENOUS; SUBCUTANEOUS
Status: DISCONTINUED | OUTPATIENT
Start: 2020-04-25 | End: 2020-04-25

## 2020-04-25 RX ORDER — HYDROCORTISONE 2.5 %
CREAM (GRAM) TOPICAL 3 TIMES DAILY PRN
Status: DISCONTINUED | OUTPATIENT
Start: 2020-04-25 | End: 2020-04-26 | Stop reason: HOSPADM

## 2020-04-25 RX ORDER — CLINDAMYCIN PHOSPHATE 900 MG/50ML
900 INJECTION, SOLUTION INTRAVENOUS EVERY 8 HOURS
Status: COMPLETED | OUTPATIENT
Start: 2020-04-25 | End: 2020-04-26

## 2020-04-25 RX ORDER — AMOXICILLIN 250 MG
1 CAPSULE ORAL 2 TIMES DAILY
Status: DISCONTINUED | OUTPATIENT
Start: 2020-04-25 | End: 2020-04-26 | Stop reason: HOSPADM

## 2020-04-25 RX ORDER — PRENATAL VIT/IRON FUM/FOLIC AC 27MG-0.8MG
1 TABLET ORAL DAILY
Status: DISCONTINUED | OUTPATIENT
Start: 2020-04-25 | End: 2020-04-26 | Stop reason: HOSPADM

## 2020-04-25 RX ORDER — LIDOCAINE 40 MG/G
CREAM TOPICAL
Status: DISCONTINUED | OUTPATIENT
Start: 2020-04-25 | End: 2020-04-25

## 2020-04-25 RX ORDER — BISACODYL 10 MG
10 SUPPOSITORY, RECTAL RECTAL DAILY PRN
Status: DISCONTINUED | OUTPATIENT
Start: 2020-04-27 | End: 2020-04-26 | Stop reason: HOSPADM

## 2020-04-25 RX ORDER — FENTANYL CITRATE 50 UG/ML
50-100 INJECTION, SOLUTION INTRAMUSCULAR; INTRAVENOUS
Status: DISCONTINUED | OUTPATIENT
Start: 2020-04-25 | End: 2020-04-25

## 2020-04-25 RX ORDER — IBUPROFEN 400 MG/1
800 TABLET, FILM COATED ORAL EVERY 6 HOURS PRN
Status: DISCONTINUED | OUTPATIENT
Start: 2020-04-25 | End: 2020-04-26 | Stop reason: HOSPADM

## 2020-04-25 RX ORDER — MISOPROSTOL 200 UG/1
400 TABLET ORAL
Status: DISCONTINUED | OUTPATIENT
Start: 2020-04-25 | End: 2020-04-26 | Stop reason: HOSPADM

## 2020-04-25 RX ORDER — IBUPROFEN 400 MG/1
800 TABLET, FILM COATED ORAL
Status: DISCONTINUED | OUTPATIENT
Start: 2020-04-25 | End: 2020-04-25

## 2020-04-25 RX ORDER — OXYTOCIN/0.9 % SODIUM CHLORIDE 30/500 ML
340 PLASTIC BAG, INJECTION (ML) INTRAVENOUS CONTINUOUS PRN
Status: DISCONTINUED | OUTPATIENT
Start: 2020-04-25 | End: 2020-04-26 | Stop reason: HOSPADM

## 2020-04-25 RX ORDER — METHYLERGONOVINE MALEATE 0.2 MG/ML
200 INJECTION INTRAVENOUS
Status: DISCONTINUED | OUTPATIENT
Start: 2020-04-25 | End: 2020-04-25

## 2020-04-25 RX ORDER — SODIUM CHLORIDE, SODIUM LACTATE, POTASSIUM CHLORIDE, CALCIUM CHLORIDE 600; 310; 30; 20 MG/100ML; MG/100ML; MG/100ML; MG/100ML
INJECTION, SOLUTION INTRAVENOUS CONTINUOUS
Status: DISCONTINUED | OUTPATIENT
Start: 2020-04-25 | End: 2020-04-25

## 2020-04-25 RX ORDER — NALBUPHINE HYDROCHLORIDE 10 MG/ML
2.5-5 INJECTION, SOLUTION INTRAMUSCULAR; INTRAVENOUS; SUBCUTANEOUS EVERY 6 HOURS PRN
Status: DISCONTINUED | OUTPATIENT
Start: 2020-04-25 | End: 2020-04-25

## 2020-04-25 RX ORDER — OXYTOCIN/0.9 % SODIUM CHLORIDE 30/500 ML
100 PLASTIC BAG, INJECTION (ML) INTRAVENOUS CONTINUOUS
Status: DISCONTINUED | OUTPATIENT
Start: 2020-04-25 | End: 2020-04-26 | Stop reason: HOSPADM

## 2020-04-25 RX ORDER — NALOXONE HYDROCHLORIDE 0.4 MG/ML
.1-.4 INJECTION, SOLUTION INTRAMUSCULAR; INTRAVENOUS; SUBCUTANEOUS
Status: DISCONTINUED | OUTPATIENT
Start: 2020-04-25 | End: 2020-04-26 | Stop reason: HOSPADM

## 2020-04-25 RX ORDER — AMOXICILLIN 250 MG
2 CAPSULE ORAL 2 TIMES DAILY
Status: DISCONTINUED | OUTPATIENT
Start: 2020-04-25 | End: 2020-04-26 | Stop reason: HOSPADM

## 2020-04-25 RX ORDER — ACETAMINOPHEN 325 MG/1
650 TABLET ORAL EVERY 4 HOURS PRN
Status: DISCONTINUED | OUTPATIENT
Start: 2020-04-25 | End: 2020-04-25

## 2020-04-25 RX ORDER — OXYTOCIN/0.9 % SODIUM CHLORIDE 30/500 ML
1-24 PLASTIC BAG, INJECTION (ML) INTRAVENOUS CONTINUOUS
Status: DISCONTINUED | OUTPATIENT
Start: 2020-04-25 | End: 2020-04-25

## 2020-04-25 RX ADMIN — Medication 340 ML/HR: at 15:38

## 2020-04-25 RX ADMIN — Medication 5 MG: at 11:50

## 2020-04-25 RX ADMIN — ROPIVACAINE HYDROCHLORIDE 3 ML: 2 INJECTION, SOLUTION EPIDURAL; INFILTRATION at 11:48

## 2020-04-25 RX ADMIN — CLINDAMYCIN IN 5 PERCENT DEXTROSE 900 MG: 18 INJECTION, SOLUTION INTRAVENOUS at 17:26

## 2020-04-25 RX ADMIN — Medication 5 MG: at 11:55

## 2020-04-25 RX ADMIN — ROPIVACAINE HYDROCHLORIDE 3 ML: 2 INJECTION, SOLUTION EPIDURAL; INFILTRATION at 11:43

## 2020-04-25 RX ADMIN — IBUPROFEN 800 MG: 400 TABLET, FILM COATED ORAL at 23:24

## 2020-04-25 RX ADMIN — ACETAMINOPHEN 650 MG: 325 TABLET, FILM COATED ORAL at 17:26

## 2020-04-25 RX ADMIN — IBUPROFEN 800 MG: 400 TABLET, FILM COATED ORAL at 17:26

## 2020-04-25 RX ADMIN — SENNOSIDES AND DOCUSATE SODIUM 1 TABLET: 8.6; 5 TABLET ORAL at 23:23

## 2020-04-25 RX ADMIN — Medication 1 MILLI-UNITS/MIN: at 12:30

## 2020-04-25 RX ADMIN — ROPIVACAINE HYDROCHLORIDE 4 ML: 2 INJECTION, SOLUTION EPIDURAL; INFILTRATION at 11:45

## 2020-04-25 RX ADMIN — SODIUM CHLORIDE, POTASSIUM CHLORIDE, SODIUM LACTATE AND CALCIUM CHLORIDE 250 ML: 600; 310; 30; 20 INJECTION, SOLUTION INTRAVENOUS at 11:59

## 2020-04-25 RX ADMIN — ACETAMINOPHEN 650 MG: 325 TABLET, FILM COATED ORAL at 23:24

## 2020-04-25 RX ADMIN — Medication 12 ML/HR: at 11:58

## 2020-04-25 RX ADMIN — SODIUM CHLORIDE, POTASSIUM CHLORIDE, SODIUM LACTATE AND CALCIUM CHLORIDE 1000 ML: 600; 310; 30; 20 INJECTION, SOLUTION INTRAVENOUS at 13:13

## 2020-04-25 RX ADMIN — SODIUM CHLORIDE, POTASSIUM CHLORIDE, SODIUM LACTATE AND CALCIUM CHLORIDE 1000 ML: 600; 310; 30; 20 INJECTION, SOLUTION INTRAVENOUS at 09:36

## 2020-04-25 NOTE — ANESTHESIA PROCEDURE NOTES
Procedure note : epidural catheter  Staff -   Anesthesiologist:  Guicho Vernon MD      Performed By: anesthesiologist        Pre-Procedure  Performed by Guicho Vernon MD  Location: OB      Pre-Anesthestic Checklist: patient identified, IV checked, risks and benefits discussed, informed consent, monitors and equipment checked, pre-op evaluation and at physician/surgeon's request    Timeout  Correct Patient: Yes   Correct Procedure: Yes   Correct Site: Yes   Correct Laterality: N/A   Correct Position: Yes   Site Marked: N/A   .   Procedure Documentation    .    Procedure: epidural catheter, .   Patient Position:sitting Insertion Site:L3-4  (midline approach) Injection technique: LORT saline   Local skin infiltrated with 3 mL of 1% lidocaine.  OLEG at 6 cm    Patient Prep/Sterile Barriers; mask, sterile gloves, povidone-iodine 7.5% surgical scrub, patient draped.  .  Needle: Touhy needle   Needle Gauge: 17.    Needle Length (Inches) 3.5   # of attempts: 1 and # of redirects: : 0. .    Catheter: 19 G . .  Catheter threaded easily  4 cm epidural space.  10 cm at skin.   .    Assessment/Narrative  Paresthesias: No.  .  .  Aspiration negative for heme or CSF  . Test dose of 3 mL lidocaine 1.5% w/ 1:200,000 epinephrine at. Test dose negative for signs of intravascular, subdural or intrathecal injection. Comments:  Pt tolerated well. No complications.   Catheter taped sterile and securely with sterile medical adhesive spray and tegaderm.   Pt placed back in supine with ERROL.   FHTs stable post-procedure.

## 2020-04-25 NOTE — H&P
There has been no significant change in Brandy's general health status based upon review of the prenatal records, nursing database and exam.    Brandy is a 39 year old [id number] IUP at 39 weeks who is admitted for induction. She has had an uncomplicated pregnancy except for very high anxiety.     GBS negative  Rh positive  Rubella Nonimmune  Received Tdap    Cx= 2cm70%/-2 AROM= clear fluid    Category 1 fetal tracing  EFW= 7 lb    A: IUP at 39 weeks      Admitted for elective induction  P: AROM      Epidural/pitocin as needed

## 2020-04-25 NOTE — ANESTHESIA PREPROCEDURE EVALUATION
"Anesthesia Pre-Procedure Evaluation    Patient: Brandy Daniel   MRN: [id number] : 1980          Preoperative Diagnosis: * No surgery found *        Past Medical History:   Diagnosis Date     Abnormal Pap smear     cryo     Past Surgical History:   Procedure Laterality Date     DILATION AND CURETTAGE       LASIK       MAMMOPLASTY AUGMENTATION BILATERAL       MAMMOPLASTY REDUCTION BILATERAL  2018       Anesthesia Evaluation       history and physical reviewed .             ROS/MED HX    ENT/Pulmonary:  - neg pulmonary ROS     Neurologic:  - neg neurologic ROS     Cardiovascular:  - neg cardiovascular ROS       METS/Exercise Tolerance:     Hematologic:         Musculoskeletal:         GI/Hepatic:  - neg GI/hepatic ROS       Renal/Genitourinary:         Endo:         Psychiatric:         Infectious Disease:         Malignancy:         Other:                     neg OB ROS            Physical Exam  Normal systems: cardiovascular and pulmonary    Airway   Mallampati: II  TM distance: > 3 FB  Neck ROM: full  Mouth opening: > 3 cm    Dental     Cardiovascular       Pulmonary             Lab Results   Component Value Date    WBC 9.4 2020    HGB 12.0 2020    HCT 36.5 2020     2020       Preop Vitals  BP Readings from Last 3 Encounters:   20 (!) 152/72   20 112/60   20 113/55    Pulse Readings from Last 3 Encounters:   20 84   19 52   12 67      Resp Readings from Last 3 Encounters:   20 18   20 16   20 16    SpO2 Readings from Last 3 Encounters:   20 99%      Temp Readings from Last 1 Encounters:   20 37.5  C (99.5  F) (Oral)    Ht Readings from Last 1 Encounters:   20 1.321 m (4' 4\")      Wt Readings from Last 1 Encounters:   20 74.8 kg (165 lb)    Estimated body mass index is 42.9 kg/m  as calculated from the following:    Height as of 20: 1.321 m (4' 4\").    Weight as of this encounter: 74.8 kg " (165 lb).       Anesthesia Plan      History & Physical Review      ASA Status:  2 .  OB Epidural Asa: 2            Postoperative Care      Consents  Anesthetic plan, risks, benefits and alternatives discussed with:  Patient..                 Guicho Vernon MD

## 2020-04-25 NOTE — L&D DELIVERY NOTE
Delivery Date:  2020      DELIVERY SUMMARY:  Brandy is a 40-year-old  4, now para 3 with an intrauterine pregnancy at 39 weeks who was admitted to Labor and Delivery on the morning of delivery for induction.  She was noted to be 2 cm, 70% effaced, -2 station.  Artificial rupture of membranes was performed, clear fluid was noted.  She was started on Pitocin IV. Within a few hours she dilated to 3 cm.  She received an epidural.  Within several hours she was completely dilated.  She pushed just a few times, delivering over a small first-degree midline tear.  The anterior shoulder delivered easily followed by the rest of body.  The baby delivered in the MIRLANDE position.  It was a female infant who was crying upon delivery with Apgars of 8 at one minute, 9 at five minutes.  Weight is pending at this time.      Delayed cord clamping was observed for 1 minute.  The cord was doubly clamped and ligated by the father of the baby.  The patient was given IV Pitocin.  The cord bloods were obtained.  Placenta delivered easily within about 7 minutes; however, there were trailing membranes that were partially removed manually and complete removal with the banjo curette.  Due to this procedure the patient will be given 2 doses of clindamycin IV.  The uterus became firm with IV Pitocin.  The QBL is pending at this time.  The first-degree tear was repaired using 3-0 chromic suture in the usual manner.  Both the patient and baby were doing well post-repair.         ADAIR RAMOS MD             D: 2020   T: 2020   MT: TAMMY      Name:     BRANDY CARPENTER   MRN:      [id number]        Account:        [id number]   :      1980        Delivery Date:  2020               Document: [id number]       cc: Adair Ramos MD

## 2020-04-25 NOTE — PLAN OF CARE
Patient having chest heaviness and nausea despite two doses of ephedrine and a fluid bolus post epidural administration  Call placed to Dr. Delgado to inform .  Order received to stop epidural infusion for twenty minutes and bolus patient with LR for a total of two and a half liters.  Patient in agreement with plan.  Epidural stopped

## 2020-04-25 NOTE — PROGRESS NOTES
female   placenta except for trailing membranes that required both manual and curette to remove.  1 MLtear repaired  qBL pending

## 2020-04-26 VITALS
BODY MASS INDEX: 30.36 KG/M2 | SYSTOLIC BLOOD PRESSURE: 139 MMHG | DIASTOLIC BLOOD PRESSURE: 73 MMHG | HEART RATE: 62 BPM | HEIGHT: 62 IN | TEMPERATURE: 98.4 F | WEIGHT: 165 LBS | OXYGEN SATURATION: 95 % | RESPIRATION RATE: 16 BRPM

## 2020-04-26 LAB — T PALLIDUM AB SER QL: NONREACTIVE

## 2020-04-26 PROCEDURE — 25000128 H RX IP 250 OP 636: Performed by: OBSTETRICS & GYNECOLOGY

## 2020-04-26 PROCEDURE — 25000132 ZZH RX MED GY IP 250 OP 250 PS 637: Performed by: OBSTETRICS & GYNECOLOGY

## 2020-04-26 PROCEDURE — 90707 MMR VACCINE SC: CPT | Performed by: OBSTETRICS & GYNECOLOGY

## 2020-04-26 PROCEDURE — 25000125 ZZHC RX 250: Performed by: OBSTETRICS & GYNECOLOGY

## 2020-04-26 RX ORDER — IBUPROFEN 800 MG/1
800 TABLET, FILM COATED ORAL EVERY 8 HOURS PRN
Qty: 25 TABLET | Refills: 0 | Status: SHIPPED | OUTPATIENT
Start: 2020-04-26 | End: 2024-09-27

## 2020-04-26 RX ORDER — AMOXICILLIN 250 MG
1 CAPSULE ORAL 2 TIMES DAILY
Qty: 20 TABLET | Refills: 0 | Status: SHIPPED | OUTPATIENT
Start: 2020-04-26 | End: 2023-10-12

## 2020-04-26 RX ORDER — ACETAMINOPHEN 325 MG/1
650 TABLET ORAL EVERY 6 HOURS PRN
Qty: 1 BOTTLE | Refills: 0 | Status: SHIPPED | OUTPATIENT
Start: 2020-04-26

## 2020-04-26 RX ADMIN — PRENATAL VITAMINS-IRON FUMARATE 27 MG IRON-FOLIC ACID 0.8 MG TABLET 1 TABLET: at 08:13

## 2020-04-26 RX ADMIN — ACETAMINOPHEN 650 MG: 325 TABLET, FILM COATED ORAL at 12:22

## 2020-04-26 RX ADMIN — ACETAMINOPHEN 650 MG: 325 TABLET, FILM COATED ORAL at 05:33

## 2020-04-26 RX ADMIN — SENNOSIDES AND DOCUSATE SODIUM 1 TABLET: 8.6; 5 TABLET ORAL at 08:13

## 2020-04-26 RX ADMIN — MEASLES, MUMPS, AND RUBELLA VIRUS VACCINE LIVE 0.5 ML: 1000; 12500; 1000 INJECTION, POWDER, LYOPHILIZED, FOR SUSPENSION SUBCUTANEOUS at 13:50

## 2020-04-26 RX ADMIN — IBUPROFEN 800 MG: 400 TABLET, FILM COATED ORAL at 05:33

## 2020-04-26 RX ADMIN — IBUPROFEN 800 MG: 400 TABLET, FILM COATED ORAL at 12:22

## 2020-04-26 RX ADMIN — CLINDAMYCIN IN 5 PERCENT DEXTROSE 900 MG: 18 INJECTION, SOLUTION INTRAVENOUS at 01:10

## 2020-04-26 NOTE — PROGRESS NOTES
Doing well.    vss afeb    A: PPD 1 s/p       Doing well  P: Routine PP care       Discharge home       Precautions reviewed       RTC 6 weeks       Forms filled out

## 2020-04-26 NOTE — PLAN OF CARE
VSS Pt using Ibuprofen and tylenol for pain control. Also using ice and tucks to perineum. Using hot packs to abdomen for cramping. Pt has discharge order. Wants to discharge at 24 hours. ROP to be notarized prior to discharge.Discharging with significant other and infant.

## 2020-04-26 NOTE — PLAN OF CARE
Data: Brandy Daniel transferred to [facility] via wheelchair at 1900. Baby transferred via parent's arms.  Action: Receiving unit notified of transfer: Yes. Patient and family notified of room change.  Belongings sent to receiving unit. Accompanied by Registered Nurse. Oriented patient to surroundings. Call light within reach.  Response: Patient tolerated transfer and is stable.

## 2020-04-26 NOTE — PLAN OF CARE
Attempted to get up to bathroom to void.  Voiding scant amounts, bladder deviated to the right.  Assisted to bed, straight cathed for 600 ml clear stiven urine

## 2020-04-26 NOTE — LACTATION NOTE
Initial visit with Brandy, FOB and infant. Infant was at breast when LC into room. Latch comfortable for mom and deep. Wide flanged lips. 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 Brandy with history of breast reduction since last pregnancy.  first 2 children without issues. During this pregnancy Brandy reports breast change, darker veins and hearing infant swallowing at breast. Would like to try as much as possible to bring in full milk supply. Pumping set up in room with hospital grade pump. Hands free bra made. Making More Milk sheet given. Encouraged to watch Maximizing Milk Supply. Pumping at this time.   Breastfeeding general information reviewed.   Advised to breastfeed exclusively, on demand, avoid pacifiers, bottles and formula unless medically indicated.  Encouraged rooming in, skin to skin, feeding on demand 8-12x/day or sooner if baby cues.  Explained benefits of holding and skin to skin. Discussed typical feeding pattern for the next 24-48 hours.  Breastfeeding going well per mother. Pt has Spectra pump for use at home.  No further questions at this time. Will follow as needed.     HYACINTH Edwards RN, BSN, PHN, IBCLC

## 2020-04-26 NOTE — ANESTHESIA POSTPROCEDURE EVALUATION
Patient: Brandy Daniel    * No procedures listed *    Diagnosis:* No pre-op diagnosis entered *  Diagnosis Additional Information: No value filed.    Anesthesia Type:  No value filed.    Note:  Anesthesia Post Evaluation    Patient location during evaluation: Bedside  Patient participation: Able to fully participate in evaluation  Level of consciousness: awake and alert  Pain management: adequate  Airway patency: patent  Cardiovascular status: acceptable  Respiratory status: acceptable  Hydration status: acceptable  PONV: none             Last vitals:  Vitals:    04/25/20 2334 04/26/20 0800 04/26/20 1530   BP: 124/67 119/72    Resp: 16 16    Temp: 36.6  C (97.9  F) 36.8  C (98.3  F) 36.9  C (98.4  F)   SpO2:            Electronically Signed By: Orlando Messina MD  April 26, 2020  5:39 PM

## 2020-04-26 NOTE — PLAN OF CARE
Vital signs stable. Postpartum assessment WDL. Pain controlled with Tylenol/Ibuprofen. Patient voiding without difficulty. Breastfeeding on cue with minimal assist. Patient and infant bonding well. Will continue with current plan of care.

## 2020-04-26 NOTE — LACTATION NOTE
Lactation check-in prior to discharge. Per Brandy, infant has been feeding well. C/o slight nipple tenderness, otherwise intact.  No questions or concerns. With hx of reduction, discuss monitoring output closely, weight gain, as well as infant satiation after feedings. Appreciative of visit; supportive partner present.     Yanelis Horowitz RN, IBCLC

## 2020-09-28 ENCOUNTER — VIRTUAL VISIT (OUTPATIENT)
Dept: FAMILY MEDICINE | Facility: OTHER | Age: 40
End: 2020-09-28

## 2020-09-28 ENCOUNTER — AMBULATORY - HEALTHEAST (OUTPATIENT)
Dept: FAMILY MEDICINE | Facility: CLINIC | Age: 40
End: 2020-09-28

## 2020-09-28 DIAGNOSIS — Z20.822 SUSPECTED COVID-19 VIRUS INFECTION: ICD-10-CM

## 2020-09-28 NOTE — PROGRESS NOTES
"Date: 2020 09:25:34  Clinician: Paty Tian  Clinician NPI: [id number]  Patient: ammon Daniel  Patient : 1980  Patient Address: [address] [place] [address]  Patient Phone: [phone]  Visit Protocol: URI  Patient Summary:  ammon is a 40 year old ( : 1980 ) female who initiated a [facility] Visit for COVID-19 (Coronavirus) evaluation and screening. When asked the question \"Please sign me up to receive news, health information and promotions. \", ammon responded \"No\".    ammon states her symptoms started gradually 3-4 days ago. After her symptoms started, they improved and then got worse again.   Her symptoms consist of a cough, nasal congestion, a headache, ear pain, enlarged lymph nodes, myalgia, malaise, and a sore throat. She is experiencing mild difficulty breathing with activities but can speak normally in full sentences.   Symptom details     Nasal secretions: The color of her mucus is clear.    Cough: ammon coughs a few times an hour and her cough is not more bothersome at night. Phlegm comes into her throat when she coughs. She does not believe her cough is caused by post-nasal drip. The color of the phlegm is clear.     Sore throat: ammon reports having moderate throat pain (4-6 on a 10 point pain scale), does not have exudate on her tonsils, and can swallow liquids. The lymph nodes in her neck are enlarged. A rash has not appeared on the skin since the sore throat started.     Headache: She states the headache is mild (1-3 on a 10 point pain scale).      ammon denies having anosmia, vomiting, rhinitis, nausea, wheezing, facial pain or pressure, fever, chills, teeth pain, ageusia, and diarrhea. She also denies having a sinus infection within the past year, taking antibiotic medication in the past month, and having recent facial or sinus surgery in the past 60 days.   Precipitating events  Within the past week, ammon has not been exposed to someone with strep throat. " She has not recently been exposed to someone with influenza. ammon has been in close contact with the following high risk individuals: immunocompromised people and children under the age of 5.   Pertinent COVID-19 (Coronavirus) information  In the past 14 days, ammon has not worked in a congregate living setting.   She either works or volunteers as a healthcare worker or a , or works or volunteers in a healthcare facility. She provides direct patient care. 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 She lives with a healthcare worker.   ammon has not had a close contact with a laboratory-confirmed COVID-19 patient within 14 days of symptom onset.   Since December 2019, ammon and has had upper respiratory infection (URI) or influenza-like illness. Has not been diagnosed with lab-confirmed COVID-19 test      Date(s) of previous URI or influenza-like illness (free-text): feb/March and July     Symptoms ammon experienced during previous URI or influenza-like illness as reported by the patient (free-text): fever, headache, body aches, loss of taste, cough, sore throat,        Pertinent medical history  ammon does not get yeast infections when she takes antibiotics.   ammon does not need a return to work/school note.   Weight: 130 lbs   ammon does not smoke or use smokeless tobacco.   She denies pregnancy and is breastfeeding. She does not menstruate.   Weight: 130 lbs    MEDICATIONS: Complete Multivitamin-Multimineral oral, ALLERGIES: Cephalosporins  Clinician Response:  Dear ammon,   Your symptoms show that you may have coronavirus (COVID-19). This illness can cause fever, cough and trouble breathing. Many people get a mild case and get better on their own. Some people can get very sick.  What should I do?  We would like to test you for this virus.   1. Please call [phone] to  "schedule your visit. Explain that you were referred [facility] to have a COVID-19 test. Be ready to share your [facility] visit ID number.  The following will serve as your written order for this COVID Test, ordered by me, for the indication of suspected COVID [Z20.828]: The test will be ordered in [employer], our electronic health record, after you are scheduled. It will show as ordered and authorized by Santosh Adam MD.  Order: COVID-19 (Coronavirus) PCR for SYMPTOMATIC testing from [facility].      2. When it's time for your COVID test:  Stay at least 6 feet away from others. (If someone will drive you to your test, stay in the backseat, as far away from the  as you can.)   Cover your mouth and nose with a mask, tissue or washcloth.  Go straight to the testing site. Don't make any stops on the way there or back.      3.Starting now: Stay home and away from others (self-isolate) until:   You've had no fever---and no medicine that reduces fever---for one full day (24 hours). And...   Your other symptoms have gotten better. For example, your cough or breathing has improved. And...   At least 10 days have passed since your symptoms started.       During this time, don't leave the house except for testing or medical care.   Stay in your own room, even for meals. Use your own bathroom if you can.   Stay away from others in your home. No hugging, kissing or shaking hands. No visitors.  Don't go to work, school or anywhere else.    Clean \"high touch\" surfaces often (doorknobs, counters, handles, etc.). Use a household cleaning spray or wipes. You'll find a full list of  on the EPA website: www.epa.gov/pesticide-registration/list-n-disinfectants-use-against-sars-cov-2.   Cover your mouth and nose with a mask, tissue or washcloth to avoid spreading germs.  Wash your hands and face often. Use soap and water.  Caregivers in these groups are at risk for severe illness due to COVID-19:  o People 65 years and older  o People who " live in a nursing home or long-term care facility  o People with chronic disease (lung, heart, cancer, diabetes, kidney, liver, immunologic)  o People who have a weakened immune system, including those who:   Are in cancer treatment  Take medicine that weakens the immune system, such as corticosteroids  Had a bone marrow or organ transplant  Have an immune deficiency  Have poorly controlled HIV or AIDS  Are obese (body mass index of 40 or higher)  Smoke regularly   o Caregivers should wear gloves while washing dishes, handling laundry and cleaning bedrooms and bathrooms.  o Use caution when washing and drying laundry: Don't shake dirty laundry, and use the warmest water setting that you can.  o For more tips, go to www.cdc.gov/coronavirus/2019-ncov/downloads/10Things.pdf.    4.Sign up for [employer]. We know it's scary to hear that you might have COVID-19. We want to track your symptoms to make sure you're okay over the next 2 weeks. Please look for an email from [employer]---this is a free, online program that we'll use to keep in touch. To sign up, follow the link in the email. Learn more at [email or web address]  How can I take care of myself?   Get lots of rest. Drink extra fluids (unless a doctor has told you not to).   Take Tylenol (acetaminophen) for fever or pain. If you have liver or kidney problems, ask your family doctor if it's okay to take Tylenol.   Adults can take either:    650 mg (two 325 mg pills) every 4 to 6 hours, or...   1,000 mg (two 500 mg pills) every 8 hours as needed.    Note: Don't take more than 3,000 mg in one day. Acetaminophen is found in many medicines (both prescribed and over-the-counter medicines). Read all labels to be sure you don't take too much.   For children, check the Tylenol bottle for the right dose. The dose is based on the child's age or weight.    If you have other health problems (like cancer, heart failure, an organ transplant or severe kidney  disease): Call your specialty clinic if you don't feel better in the next 2 days.       Know when to call 911. Emergency warning signs include:    Trouble breathing or shortness of breath Pain or pressure in the chest that doesn't go away Feeling confused like you haven't felt before, or not being able to wake up Bluish-colored lips or face.  Where can I get more information?   [facility] -- About COVID-19: [email or web address]   CDC -- What to Do If You're Sick: www.cdc.gov/coronavirus/2019-ncov/about/steps-when-sick.html   CDC -- Ending Home Isolation: www.cdc.gov/coronavirus/2019-ncov/hcp/disposition-in-home-patients.html   [facility] -- Caring for Someone: www.cdc.gov/coronavirus/2019-ncov/if-you-are-sick/care-for-someone.html   [facility] -- Interim Guidance for Hospital Discharge to Home: www.[facility].mn.us/diseases/coronavirus/hcp/hospdischarge.pdf   [facility] clinical trials (COVID-19 research studies): clinicalaffairs.[facility]-clinical-trials    Below are the COVID-19 hotlines at the [facility] of Health ([facility]). Interpreters are available.    For health questions: Call [phone] or 1-180.519.6607 (7 a.m. to 7 p.m.) For questions about schools and childcare: Call 324-712-6286 or 1-164.170.1418 (7 a.m. to 7 p.m.)    Diagnosis: Cough  Diagnosis ICD: R05

## 2020-09-29 ENCOUNTER — AMBULATORY - HEALTHEAST (OUTPATIENT)
Dept: FAMILY MEDICINE | Facility: CLINIC | Age: 40
End: 2020-09-29

## 2020-09-29 DIAGNOSIS — Z20.822 SUSPECTED COVID-19 VIRUS INFECTION: ICD-10-CM

## 2020-10-01 ENCOUNTER — COMMUNICATION - HEALTHEAST (OUTPATIENT)
Dept: SCHEDULING | Facility: CLINIC | Age: 40
End: 2020-10-01

## 2020-11-30 ENCOUNTER — AMBULATORY - HEALTHEAST (OUTPATIENT)
Dept: MAMMOGRAPHY | Facility: CLINIC | Age: 40
End: 2020-11-30

## 2020-11-30 ENCOUNTER — RECORDS - HEALTHEAST (OUTPATIENT)
Dept: ADMINISTRATIVE | Facility: OTHER | Age: 40
End: 2020-11-30

## 2020-11-30 ENCOUNTER — HOSPITAL ENCOUNTER (OUTPATIENT)
Dept: MAMMOGRAPHY | Facility: CLINIC | Age: 40
Discharge: HOME | End: 2020-11-30

## 2020-11-30 DIAGNOSIS — N63.20 LEFT BREAST LUMP: ICD-10-CM

## 2020-11-30 DIAGNOSIS — N64.89 BREAST ASYMMETRY: ICD-10-CM

## 2020-11-30 DIAGNOSIS — Z11.59 ENCOUNTER FOR SCREENING FOR OTHER VIRAL DISEASES: ICD-10-CM

## 2020-12-01 ENCOUNTER — RECORDS - HEALTHEAST (OUTPATIENT)
Dept: ADMINISTRATIVE | Facility: OTHER | Age: 40
End: 2020-12-01

## 2020-12-01 ENCOUNTER — RECORDS - HEALTHEAST (OUTPATIENT)
Dept: RADIOLOGY | Facility: CLINIC | Age: 40
End: 2020-12-01

## 2020-12-07 ENCOUNTER — AMBULATORY - HEALTHEAST (OUTPATIENT)
Dept: LAB | Facility: CLINIC | Age: 40
End: 2020-12-07

## 2020-12-07 DIAGNOSIS — Z11.59 ENCOUNTER FOR SCREENING FOR OTHER VIRAL DISEASES: ICD-10-CM

## 2020-12-09 ENCOUNTER — COMMUNICATION - HEALTHEAST (OUTPATIENT)
Dept: SCHEDULING | Facility: CLINIC | Age: 40
End: 2020-12-09

## 2020-12-10 ENCOUNTER — HOSPITAL ENCOUNTER (OUTPATIENT)
Dept: MAMMOGRAPHY | Facility: CLINIC | Age: 40
Discharge: HOME | End: 2020-12-10

## 2020-12-10 DIAGNOSIS — N63.20 LEFT BREAST MASS: ICD-10-CM

## 2020-12-11 ENCOUNTER — COMMUNICATION - HEALTHEAST (OUTPATIENT)
Dept: MAMMOGRAPHY | Facility: CLINIC | Age: 40
End: 2020-12-11

## 2020-12-11 LAB
LAB AP CHARGES (HE HISTORICAL CONVERSION): NORMAL
PATH REPORT.COMMENTS IMP SPEC: NORMAL
PATH REPORT.COMMENTS IMP SPEC: NORMAL
PATH REPORT.FINAL DX SPEC: NORMAL
PATH REPORT.GROSS SPEC: NORMAL
PATH REPORT.MICROSCOPIC SPEC OTHER STN: NORMAL
PATH REPORT.RELEVANT HX SPEC: NORMAL
RESULT FLAG (HE HISTORICAL CONVERSION): NORMAL

## 2021-06-01 VITALS — HEIGHT: 62 IN | WEIGHT: 125 LBS | BODY MASS INDEX: 23 KG/M2

## 2021-06-02 NOTE — PROGRESS NOTES
"Please see \"Imaging\" tab under Chart Review for full report.  This ultrasound was performed in the [facility], and may be located under Care Everywhere.    Nicolasa Carmona MD  Maternal Fetal Medicine    "

## 2021-06-02 NOTE — PROGRESS NOTES
[facility]  Genetic Counseling Consult    Patient: Brandy Daniel YOB: 1980   Date of Service: 10/18/2019      Brandy Daniel was seen at [facility] for genetic consultation to discuss the options for screening and testing for fetal chromosome abnormalities.  The indication for genetic counseling is advanced maternal age.        Impression/Plan:   1.  Brandy had blood draw for NIPT (Innatal test through [employer]).  Results are expected within 7-10 days, and will be available in [employer].  We will contact her to discuss the results, and a copy will be forwarded to the office of the referring OB provider. Brandy requested a detailed message with results on her voicemail ([phone]). She does not want fetal sex information on her voicemail.     2. Brandy opted to have a nuchal translucency ultrasound today. Please see ultrasound report for additional information.    3.  Maternal serum AFP (single marker screen) is recommended after 15 weeks to screen for open neural tube defects. A quad screen should not be performed.    4.  An 18-20 week comprehensive ultrasound is standard of care for all women 35 or older at delivery.    Pregnancy History:   /Parity:    Age at Delivery: 40 y.o.  CRAIG: 2020, by Last Menstrual Period  Gestational Age: 11w6d    No significant complications or exposures were reported in the current pregnancy.    Pregnancy History:  o Two full term vaginal deliveries  o One SAB      Family History:   A three-generation pedigree was obtained, and is scanned under the  Media  tab. The reported family history is negative for multiple miscarriages, stillbirths, birth defects, mental retardation, known genetic conditions, and consanguinity.       Carrier Screening:   The patient reports that she and the father of the pregnancy have  ancestry:     Cystic fibrosis is an autosomal  recessive genetic condition that occurs with increased frequency in individuals of  ancestry and carrier screening for this condition is available.  In addition,  screening in the [facility] includes cystic fibrosis. The patient had expanded carrier screening (27 conditions on the [employer] carrier screening panel), the results of which were negative.  A copy of the report was available for our review today.       Risk Assessment for Chromosome Conditions:   We explained that the risk for fetal chromosome abnormalities increases with maternal age. We discussed specific features of common chromosome abnormalities, including Down syndrome, trisomy 13, trisomy 18, and sex chromosome trisomies.    At age 40 at delivery, the midtrimester risk to have a baby with Down syndrome is 1 in 98.     At age 40 at delivery, the midtrimester risk to have a baby with any chromosome abnormality is 1 in 51.        Testing Options:   We discussed the following options:  First trimester screening    First trimester ultrasound with nuchal translucency and nasal bone assessments, maternal plasma hCG, RANDY-A, and AFP measurement    Screens for fetal trisomy 21, trisomy 13, and trisomy 18    Cannot screen for open neural tube defects; maternal serum AFP after 15 weeks is recommended    Non-invasive Prenatal Testing (NIPT)    Maternal plasma cell-free DNA testing; first trimester ultrasound with nuchal translucency and nasal bone assessment is recommended, when appropriate    Screens for fetal trisomy 21, trisomy 13, trisomy 18, and sex chromosome aneuploidy    Cannot screen for open neural tube defects; maternal serum AFP after 15 weeks is recommended    Chorionic villus sampling (CVS)    Invasive procedure typically performed in the first trimester by which placental villi are obtained for the purpose of chromosome analysis and/or other prenatal genetic analysis    Diagnostic results; >99% sensitivity for fetal  chromosome abnormalities    Cannot test for open neural tube defects; maternal serum AFP after 15 weeks is recommended    Genetic Amniocentesis    Invasive procedure typically performed in the second trimester by which amniotic fluid is obtained for the purpose of chromosome analysis and/or other prenatal genetic analysis    Diagnostic results; >99% sensitivity for fetal chromosome abnormalities    AFAFP measurement tests for open neural tube defects    Comprehensive (Level II) ultrasound: Detailed ultrasound performed between 18-22 weeks gestation to screen for major birth defects and markers for aneuploidy.    We reviewed the benefits and limitations of this testing.  Screening tests provide a risk assessment specific to the pregnancy for certain fetal chromosome abnormalities, but cannot definitively diagnose or exclude a fetal chromosome abnormality.  Follow-up genetic counseling and consideration of diagnostic testing is recommended with any abnormal screening result. Diagnostic tests carry inherent risks- including risk of miscarriage- that require careful consideration.  These tests can detect fetal chromosome abnormalities with greater than 99% certainty.  Results can be compromised by maternal cell contamination or mosaicism, and are limited by the resolution of cytogenetic G-banding technology.  There is no screening nor diagnostic test that can detect all forms of birth defects or mental disability.     It was a pleasure to be involved with Brandy s care. Face-to-face time of the meeting was 40 minutes.    Ledy Barreto MS, [facility]  Maternal Fetal Medicine  [facility]  Phone:[phone]  Phone: [phone]  Email: [email or web address]

## 2021-06-02 NOTE — TELEPHONE ENCOUNTER
Called and discussed normal NIPT results with Brandy. Results indicate NO ANEUPLOIDY DETECTED for chromosomes 21, 18, 13, or sex chromosomes. Fetal sex was not disclosed per patient wishes. This puts her current pregnancy at low risk for Down syndrome, trisomy 18, trisomy 13 and sex chromosome abnormalities. This test is reported to have the following sensitivities: Down syndrome- 99%, trisomy 18- 98%, and trisomy 13- 98%. Although these results are reassuring, this does not replace a standard chromosome analysis from a chorionic villus sampling or amniocentesis.       Ledy Barreto MS, [facility]  Maternal Fetal Medicine  [phone]

## 2021-06-04 VITALS
RESPIRATION RATE: 16 BRPM | SYSTOLIC BLOOD PRESSURE: 107 MMHG | BODY MASS INDEX: 41.32 KG/M2 | DIASTOLIC BLOOD PRESSURE: 63 MMHG | WEIGHT: 158.9 LBS | OXYGEN SATURATION: 98 % | HEART RATE: 88 BPM | TEMPERATURE: 98.3 F

## 2021-06-04 NOTE — PROGRESS NOTES
"Please see \"Imaging\" tab under Chart Review for full report.  This ultrasound was performed in the [facility], and may be located under Care Everywhere.    Nicolasa Carmona MD  Maternal Fetal Medicine    "

## 2021-06-06 NOTE — PROGRESS NOTES
[facility]      CHIEF COMPLAINT/REASON FOR VISIT:  Chief Complaint   Patient presents with     Chest Pain     lower right side       HISTORY:      HPI: Brandy is a 39 y.o. female who  has a past medical history of Anxiety and Disease of thyroid gland. She also has no past medical history of Asthma, Diabetes mellitus (H), Family history of malignant hyperthermia, History of anesthesia complications, Infectious viral hepatitis, Malignant hyperthermia due to anesthesia, PONV (postoperative nausea and vomiting), Pseudocholinesterase deficiency, Seizures (H), or Tuberculosis.  Brandy presents to the walk-in clinic complaining of right-sided chest pain.  She states that it seems to be right rib pain.  She is 28 weeks and 4 days pregnant.  She called her OB this morning who suggested she go into a walk-in or urgent care.  She states that the pain started about 2 days ago and has progressively gotten worse.  She denies any difficulty breathing or shortness of breath.  She denies any injury.  She was however sick last week with a pneumonia-like illness.  Her OB doctor did treat her with azithromycin for possible pneumonia.  She states that she did get better and then all of a sudden she had this significant pain.  Coincidentally she was also treated for  labor last Saturday and was hospitalized for the entire day.  She does report positive fetal movement, denies any vaginal leaking, vaginal discharge or any vaginal mucus.  She has not had any contractions at this time.  We did discuss course of treatment multiple times and given severity of pain and lack of diagnostic and treatment options available to us at the walk-in clinic she does elect to go to the ER.  She is worried that this could be a PE, pleural effusion or something more serious.      Past Medical History:   Diagnosis Date     Anxiety      Disease of thyroid gland     Graves Disease asymptomatic             No family history on  file.  Social History     Socioeconomic History     Marital status:      Spouse name: None     Number of children: None     Years of education: None     Highest education level: None   Occupational History     None   Social Needs     Financial resource strain: None     Food insecurity:     Worry: None     Inability: None     Transportation needs:     Medical: None     Non-medical: None   Tobacco Use     Smoking status: Never Smoker     Smokeless tobacco: Never Used   Substance and Sexual Activity     Alcohol use: Yes     Alcohol/week: 2.0 standard drinks     Types: 2 Glasses of wine per week     Drug use: No     Sexual activity: None   Lifestyle     Physical activity:     Days per week: None     Minutes per session: None     Stress: None   Relationships     Social connections:     Talks on phone: None     Gets together: None     Attends Sabianism service: None     Active member of club or organization: None     Attends meetings of clubs or organizations: None     Relationship status: None     Intimate partner violence:     Fear of current or ex partner: None     Emotionally abused: None     Physically abused: None     Forced sexual activity: None   Other Topics Concern     None   Social History Narrative     None       REVIEW OF SYSTEM:  Per HPI    PHYSICAL EXAM:   /63 (Patient Site: Right Arm, Patient Position: Sitting, Cuff Size: Adult Regular)   Pulse 88   Temp 98.3  F (36.8  C) (Oral)   Resp 16   Wt 158 lb 14.4 oz (72.1 kg)   LMP 07/27/2019   SpO2 98%   BMI 29.06 kg/m    General appearance: alert, appears stated age and cooperative  HEENT: Head is normocephalic with normal hair distribution. No evidence of trauma. Ears: Without lesions or deformity. No acute purulent discharge. Eyes: Conjunctivae pink with no scleral icterus or erythema. Nose: Normal mucosa and septum. Oropharnyx: mmm, no lesions present.  Lungs: clear to auscultation bilaterally, respirations without effort  Heart: regular  rate and rhythm, S1, S2 normal, no murmur, click, rub or gallop  Abdomen: Gravid abdomen  Extremities: extremities normal, atraumatic, no cyanosis or edema  Skin: Skin color, texture, turgor normal. No rashes or lesions  Neurologic: Grossly normal   Psych: interacts well with caregivers, exhibits logical thought processes and connections, pleasant      LABS:   None today given in ER disposition.    ASSESSMENT:      ICD-10-CM    1. Chest wall pain R07.89        PLAN:    Chest wall pain  -Etiology unclear, given vague symptoms and wide history would consider PE, pleurisy, chest wall pain, costochondritis, pneumonia, muscle strain, gallbladder dysfunction among many others.  -Patient elects to go to ER at this time.  -Patient directed to ER.    All questions answered to patient's satisfaction. No further questions posed, no comments or issues to report. Patient advised to return to primary care provider, urgent care or ER with any further complaints, issues or concerns.    Electronically signed by: Katya Villavicencio CNP

## 2021-06-13 NOTE — TELEPHONE ENCOUNTER
Called patient to inform her of her benign breast biopsy result and that she may start annual screening in a year.

## 2021-06-17 NOTE — ANESTHESIA PREPROCEDURE EVALUATION
Anesthesia Evaluation      Patient summary reviewed   No history of anesthetic complications     Airway   Mallampati: II  Neck ROM: full   Pulmonary - negative ROS and normal exam                          Cardiovascular - negative ROS and normal exam   Neuro/Psych    (+) anxiety/panic attacks,     Endo/Other       Comments: Grave's Disease - asymptomatic    GI/Hepatic/Renal - negative ROS      Other findings: Hgb 14.2  Plts 278K  Preg negative      Dental - normal exam                        Anesthesia Plan  Planned anesthetic: general endotracheal  Scopolamine patch  Acetaminophen 1000 mg IV per Dr. Heard  Decadron 10 mg IV  Zofran 4 mg IV  ASA 1   Induction: intravenous   Anesthetic plan and risks discussed with: patient and significant other  Anesthesia plan special considerations: antiemetics,   Post-op plan: routine recovery

## 2021-06-17 NOTE — ANESTHESIA CARE TRANSFER NOTE
Last vitals:   Vitals:    05/04/18 1140   BP: 127/60   Pulse: (!) 114   Resp: 16   Temp: 37.2  C (99  F)   SpO2: 100%     Patient's level of consciousness is drowsy  Spontaneous respirations: yes  Maintains airway independently: yes  Dentition unchanged: yes  Oropharynx: oropharynx clear of all foreign objects    QCDR Measures:  ASA# 20 - Surgical Safety Checklist: WHO surgical safety checklist completed prior to induction  PQRS# 430 - Adult PONV Prevention: 4558F - Pt received => 2 anti-emetic agents (different classes) preop & intraop  ASA# 8 - Peds PONV Prevention: NA - Not pediatric patient, not GA or 2 or more risk factors NOT present  PQRS# 424 - Gisselle-op Temp Management: 4559F - At least one body temp DOCUMENTED => 35.5C or 95.9F within required timeframe  PQRS# 426 - PACU Transfer Protocol: - Transfer of care checklist used  ASA# 14 - Acute Post-op Pain: ASA14B - Patient did NOT experience pain >= 7 out of 10

## 2021-06-17 NOTE — ANESTHESIA POSTPROCEDURE EVALUATION
Patient: Brandy Daniel  BILATERAL BREAST LIFT WITH BILATERAL IMPLANT EXCHANGE (LEFT RUPTURE) UPGRADE  TO SILICONE IMPLANTS(NON COVERED), REPLACEMENT, IMPLANT, BREAST (NON COVERED)  Anesthesia type: general    Patient location: PACU  Last vitals:   Vitals:    05/04/18 1330   BP: 117/61   Pulse: 73   Resp: 16   Temp:    SpO2: 97%     Post vital signs: stable  Level of consciousness: awake and responds to simple questions  Post-anesthesia pain: pain controlled  Post-anesthesia nausea and vomiting: no  Pulmonary: unassisted, return to baseline  Cardiovascular: stable and blood pressure at baseline  Hydration: adequate  Anesthetic events: no    QCDR Measures:  ASA# 11 - Gisselle-op Cardiac Arrest: ASA11B - Patient did NOT experience unanticipated cardiac arrest  ASA# 12 - Gisselle-op Mortality Rate: ASA12B - Patient did NOT die  ASA# 13 - PACU Re-Intubation Rate: ASA13B - Patient did NOT require a new airway mgmt  ASA# 10 - Composite Anes Safety: ASA10A - No serious adverse event    Additional Notes:

## 2021-07-14 ENCOUNTER — OFFICE VISIT - RIVER FALLS (OUTPATIENT)
Dept: FAMILY MEDICINE | Facility: CLINIC | Age: 41
End: 2021-07-14

## 2021-07-14 ASSESSMENT — MIFFLIN-ST. JEOR: SCORE: 1309.88

## 2021-07-15 ENCOUNTER — COMMUNICATION - RIVER FALLS (OUTPATIENT)
Dept: FAMILY MEDICINE | Facility: CLINIC | Age: 41
End: 2021-07-15

## 2021-07-15 LAB
25(OH)D3 SERPL-MCNC: 48 NG/ML (ref 30–100)
CHOLEST SERPL-MCNC: 214 MG/DL
CHOLEST/HDLC SERPL: 3.3 {RATIO}
GLUCOSE BLD-MCNC: 87 MG/DL (ref 65–99)
HDLC SERPL-MCNC: 65 MG/DL
LDLC SERPL CALC-MCNC: 134 MG/DL
NONHDLC SERPL-MCNC: 149 MG/DL
T3FREE SERPL-MCNC: 3.1 PG/ML (ref 2.3–4.2)
T4 FREE SERPL-MCNC: 1.4 NG/DL (ref 0.8–1.8)
THYROID PEROXIDASE ANTIBODIES - HISTORICAL: <1 IU/ML
TRIGL SERPL-MCNC: 49 MG/DL
TSH SERPL DL<=0.005 MIU/L-ACNC: 2.26 MIU/L

## 2021-07-26 ENCOUNTER — AMBULATORY - RIVER FALLS (OUTPATIENT)
Dept: FAMILY MEDICINE | Facility: CLINIC | Age: 41
End: 2021-07-26

## 2021-07-28 ENCOUNTER — AMBULATORY - RIVER FALLS (OUTPATIENT)
Dept: FAMILY MEDICINE | Facility: CLINIC | Age: 41
End: 2021-07-28

## 2021-09-28 ENCOUNTER — AMBULATORY - RIVER FALLS (OUTPATIENT)
Dept: FAMILY MEDICINE | Facility: CLINIC | Age: 41
End: 2021-09-28

## 2021-09-28 ENCOUNTER — LAB REQUISITION (OUTPATIENT)
Dept: LAB | Facility: CLINIC | Age: 41
End: 2021-09-28
Payer: COMMERCIAL

## 2021-09-28 ENCOUNTER — OFFICE VISIT - RIVER FALLS (OUTPATIENT)
Dept: FAMILY MEDICINE | Facility: CLINIC | Age: 41
End: 2021-09-28

## 2021-09-28 DIAGNOSIS — U07.1 COVID-19: ICD-10-CM

## 2021-09-28 PROCEDURE — U0003 INFECTIOUS AGENT DETECTION BY NUCLEIC ACID (DNA OR RNA); SEVERE ACUTE RESPIRATORY SYNDROME CORONAVIRUS 2 (SARS-COV-2) (CORONAVIRUS DISEASE [COVID-19]), AMPLIFIED PROBE TECHNIQUE, MAKING USE OF HIGH THROUGHPUT TECHNOLOGIES AS DESCRIBED BY CMS-2020-01-R: HCPCS | Mod: ORL | Performed by: FAMILY MEDICINE

## 2021-10-01 LAB
SARS-COV-2 RNA RESP QL NAA+PROBE: NEGATIVE
SARS-COV-2 RNA RESP QL NAA+PROBE: NOT DETECTED

## 2022-01-21 ENCOUNTER — OFFICE VISIT - RIVER FALLS (OUTPATIENT)
Dept: FAMILY MEDICINE | Facility: CLINIC | Age: 42
End: 2022-01-21

## 2022-01-21 ENCOUNTER — LAB REQUISITION (OUTPATIENT)
Dept: LAB | Facility: CLINIC | Age: 42
End: 2022-01-21
Payer: COMMERCIAL

## 2022-01-21 ENCOUNTER — AMBULATORY - RIVER FALLS (OUTPATIENT)
Dept: FAMILY MEDICINE | Facility: CLINIC | Age: 42
End: 2022-01-21

## 2022-01-21 DIAGNOSIS — U07.1 COVID-19: ICD-10-CM

## 2022-01-21 PROCEDURE — U0003 INFECTIOUS AGENT DETECTION BY NUCLEIC ACID (DNA OR RNA); SEVERE ACUTE RESPIRATORY SYNDROME CORONAVIRUS 2 (SARS-COV-2) (CORONAVIRUS DISEASE [COVID-19]), AMPLIFIED PROBE TECHNIQUE, MAKING USE OF HIGH THROUGHPUT TECHNOLOGIES AS DESCRIBED BY CMS-2020-01-R: HCPCS | Mod: ORL | Performed by: PHYSICIAN ASSISTANT

## 2022-01-22 LAB — SARS-COV-2 RNA RESP QL NAA+PROBE: NEGATIVE

## 2022-01-23 ENCOUNTER — NURSE TRIAGE (OUTPATIENT)
Dept: NURSING | Facility: CLINIC | Age: 42
End: 2022-01-23
Payer: COMMERCIAL

## 2022-01-23 NOTE — TELEPHONE ENCOUNTER

## 2022-01-24 LAB — SARS-COV-2 RNA RESP QL NAA+PROBE: NEGATIVE

## 2022-01-28 ENCOUNTER — LAB REQUISITION (OUTPATIENT)
Dept: LAB | Facility: CLINIC | Age: 42
End: 2022-01-28
Payer: COMMERCIAL

## 2022-01-28 ENCOUNTER — OFFICE VISIT - RIVER FALLS (OUTPATIENT)
Dept: FAMILY MEDICINE | Facility: CLINIC | Age: 42
End: 2022-01-28

## 2022-01-28 ENCOUNTER — AMBULATORY - RIVER FALLS (OUTPATIENT)
Dept: FAMILY MEDICINE | Facility: CLINIC | Age: 42
End: 2022-01-28

## 2022-01-28 DIAGNOSIS — U07.1 COVID-19: ICD-10-CM

## 2022-01-28 PROCEDURE — U0003 INFECTIOUS AGENT DETECTION BY NUCLEIC ACID (DNA OR RNA); SEVERE ACUTE RESPIRATORY SYNDROME CORONAVIRUS 2 (SARS-COV-2) (CORONAVIRUS DISEASE [COVID-19]), AMPLIFIED PROBE TECHNIQUE, MAKING USE OF HIGH THROUGHPUT TECHNOLOGIES AS DESCRIBED BY CMS-2020-01-R: HCPCS | Mod: ORL | Performed by: NURSE PRACTITIONER

## 2022-01-29 LAB — SARS-COV-2 RNA RESP QL NAA+PROBE: NEGATIVE

## 2022-01-31 LAB — SARS-COV-2 RNA RESP QL NAA+PROBE: NEGATIVE

## 2022-02-12 VITALS
SYSTOLIC BLOOD PRESSURE: 108 MMHG | WEIGHT: 154 LBS | TEMPERATURE: 97.3 F | HEIGHT: 61 IN | DIASTOLIC BLOOD PRESSURE: 66 MMHG | BODY MASS INDEX: 29.07 KG/M2 | HEART RATE: 62 BPM | OXYGEN SATURATION: 99 %

## 2022-02-12 VITALS — HEIGHT: 61 IN

## 2022-02-16 NOTE — NURSING NOTE
PPD Administration POC Entered On:  7/26/2021 10:14 AM CDT    Performed On:  7/26/2021 10:13 AM CDT by Jackelyn Mcleod RN               PPD Administration   PPD Insertion Site :   Right forearm   POC Test Comments :   wheal created   PPD Amount Administered (mL) :   0.1 mL   Jackelyn Mcleod RN - 7/26/2021 10:13 AM CDT   Details   Collection Date :   7/26/2021 10:10 AM CDT   Expiration Date :   3/17/2023 CDT   Lot#/Manufacture :   [id number]    :   [employer]   POC Test Comments :   step 1   Jackelyn Mcleod RN - 7/26/2021 10:13 AM CDT

## 2022-02-16 NOTE — NURSING NOTE
Comprehensive Intake Entered On:  7/14/2021 9:08 AM CDT    Performed On:  7/14/2021 9:03 AM CDT by Felicitas Venegas CMA               Summary   Chief Complaint :   Establish care and annual physical. Pap UTD   Menstrual Status :   Menarcheal   Weight Measured :   154 lb(Converted to: 154 lb 0 oz, 69.853 kg)    Height Measured :   61.25 in(Converted to: 5 ft 1 in, 155.57 cm)    Body Mass Index :   28.86 kg/m2 (HI)    Body Surface Area :   1.74 m2   Systolic Blood Pressure :   108 mmHg   Diastolic Blood Pressure :   66 mmHg   Mean Arterial Pressure :   80 mmHg   Peripheral Pulse Rate :   62 bpm   BP Site :   Right arm   BP Method :   Manual   Temperature Tympanic :   97.3 DegF(Converted to: 36.3 DegC)  (LOW)    Oxygen Saturation :   99 %   Felicitas Venegas CMA - 7/14/2021 9:03 AM CDT   Health Status   Allergies Verified? :   Yes   Medication History Verified? :   Yes   Medical History Verified? :   Yes   Pre-Visit Planning Status :   Completed   Tobacco Use? :   Never smoker   Felicitas Venegas CMA - 7/14/2021 9:03 AM CDT   Consents   Consent for Immunization Exchange :   Consent Granted   Consent for Immunizations to Providers :   Consent Granted   Felicitas Venegas CMA - 7/14/2021 9:03 AM CDT   Meds / Allergies   (As Of: 7/14/2021 9:08:44 AM CDT)   Allergies (Active)   cephalexin  Estimated Onset Date:   Unspecified ; Reactions:   Hives ; Created By:   Felicitas Venegas CMA; Reaction Status:   Active ; Category:   Drug ; Substance:   cephalexin ; Type:   Allergy ; Updated By:   Felicitas Venegas CMA; Reviewed Date:   7/14/2021 9:04 AM CDT        Medication List   (As Of: 7/14/2021 9:08:44 AM CDT)   Home Meds    levonorgestrel  :   levonorgestrel ; Status:   Documented ; Ordered As Mnemonic:   Mirena ; Simple Display Line:   Intrauteral, once, 0 Refill(s) ; Catalog Code:   levonorgestrel ; Order Dt/Tm:   7/14/2021 9:06:01 AM CDT            Social History   Social History   (As Of: 7/14/2021 9:08:44 AM CDT)   Tobacco:        Never  (less than 100 in lifetime)   (Last Updated: 7/14/2021 9:04:22 AM CDT by Felicitas Venegas CMA)          Electronic Cigarette/Vaping:        Electronic Cigarette Use: Never.   (Last Updated: 7/14/2021 9:04:18 AM CDT by Felicitas Venegas CMA)

## 2022-02-16 NOTE — TELEPHONE ENCOUNTER
---------------------  From: Rachael Morgan LPN   Sent: 10/1/2021 9:55:49 AM CDT  Subject: covid resutls     [facility], Notified patient that covid results negative.

## 2022-02-16 NOTE — NURSING NOTE
Seen for COVID testing at [facility] per  TFS    O2 Sat = 98%  (Children under 12 do not require O2 sat)    Specimen sent to:   labs for testing    PUI form faxed to [facility] if positive

## 2022-02-16 NOTE — NURSING NOTE
Depression Screening Entered On:  7/16/2021 2:50 PM CDT    Performed On:  7/14/2021 2:49 PM CDT by Mari Moser               Depression Screening   Little Interest - Pleasure in Activities :   Not at all   Poor Appetite or Overeating :   Not at all   Trouble Falling or Staying Asleep :   Not at all   Feeling Tired or Little Energy :   More than half the days   Feeling Bad About Yourself :   Not at all   Trouble Concentrating :   Not at all   Moving or Speaking Slowly :   Not at all   Thoughts Better Off Dead or Hurting Self :   Not at all   Detailed Depression Screen Score :   2    Mari Moser - 7/16/2021 2:49 PM CDT

## 2022-02-16 NOTE — TELEPHONE ENCOUNTER
---------------------  From: Nickolas Parham MD   To: MARCELINA Bishop Pool ([id number] [place]);     Sent: 7/15/2021 1:23:54 PM CDT  Subject: lab results     Please let patient know that labs are stable. Thyroid testing, vitamin D, and blood sugar all normal. No specific cause of weight gain found. May be related to age. I'll send a copy in the mail for her records.        Results:  Date Result Name Ind Value Ref Range   7/14/2021 9:49 AM Glucose Level  87 mg/dL (65 - 99)   7/14/2021 9:49 AM Vitamin D 25 OH  48 ng/mL (30 - 100)   7/14/2021 9:49 AM Cholesterol ((H)) 214 mg/dL ( - <200)   7/14/2021 9:49 AM Non-HDL Cholesterol ((H)) 149 ( - <130)   7/14/2021 9:49 AM HDL  65 mg/dL (> OR = 50 - )   7/14/2021 9:49 AM Cholesterol/HDL Ratio  3.3 ( - <5.0)   7/14/2021 9:49 AM LDL ((H)) 134    7/14/2021 9:49 AM Triglyceride  49 mg/dL ( - <150)   7/14/2021 9:49 AM T4 Free  1.4 ng/dL (0.8 - 1.8)   7/14/2021 9:49 AM T3 Free  3.1 pg/mL (2.3 - 4.2)   7/14/2021 9:49 AM TSH  2.26 mIU/L    7/14/2021 9:49 AM Thyroid Peroxidase Ab (TPO)  <1 IU/mL ( - <9)Pt notified

## 2022-02-16 NOTE — NURSING NOTE
Depression Screening Entered On:  7/14/2021 9:49 AM CDT    Performed On:  7/14/2021 9:49 AM CDT by Tam Kong LPN               Depression Screening   Little Interest - Pleasure in Activities :   Not at all   Feeling Down, Depressed, Hopeless :   Not at all   Initial Depression Screen Score :   0 Score   Poor Appetite or Overeating :   Not at all   Trouble Falling or Staying Asleep :   Not at all   Feeling Tired or Little Energy :   More than half the days   Feeling Bad About Yourself :   Not at all   Trouble Concentrating :   Not at all   Moving or Speaking Slowly :   Not at all   Thoughts Better Off Dead or Hurting Self :   Not at all   Difficulty at Work, Home, Getting Along :   Not difficult at all   Detailed Depression Screen Score :   2    Total Depression Screen Score :   2    Tam Kong LPN - 7/14/2021 9:49 AM CDT

## 2022-02-16 NOTE — PROGRESS NOTES
Patient:   ESTEFANIA CARPENTER            MRN: [id number]            FIN: [id number]               Age:   41 years     Sex:  Female     :  1980   Associated Diagnoses:   Well adult exam; Family history of rectal cancer; Encounter for screening colonoscopy; Fatigue; Weight gain; History of Graves' disease; Chalazion of left eye; Rash   Author:   Nickolas Parham MD      Visit Information   Visit type:  Annual exam.    Source of history:  Self.    History limitation:  None.       Chief Complaint   2021 9:03 AM CDT    Establish care and annual physical. Pap UTD      Well Adult History   Well Adult History             The patient presents for well adult exam.  The patient's general health status is described as good.  The patient's diet is described as balanced.  Associated symptoms consist of none.  Medical encounters: none.     Would like moles checked.  Has raised moles that got bigger with pregnancy.   Has a history of Graves disease several years ago that self corrected, has noticed more weight gain in past year, feeling sluggish and fatigued, does have 15 month old.  Family history of rectal cancer in mom at 52, due for colonoscopy.   Has cyst on upper right eyelid that has been coming and going, irritated, has not tried any treatment  Patient with some marital issues, interested in counseling, needs referral per insurance, would like to use [facility] if possible         Review of Systems   Musculoskeletal:  right knee crepitus, no pain.    ROS reviewed as documented in chart      Health Status   Allergies:    Allergic Reactions (Selected)  Severity Not Documented  Cephalexin (Hives)   Medications:  (Selected)   Documented Medications  Documented  Mirena: Intrauteral, once, 0 Refill(s), Type: Maintenance,    Medications          *denotes recorded medication          *Mirena: Intrauteral, once, 0 Refill(s).       Problem list:    No problem items selected or recorded.      Histories   Past  Medical History:    No active or resolved past medical history items have been selected or recorded.   Family History:    No family history items have been selected or recorded.   Procedure history:    Biopsy of breast - left, benign ([id number]) in 2020 at 39 Years.  Breast reduction ([id number]) in 2017 at 37 Years.   Social History:        Electronic Cigarette/Vaping Assessment            Electronic Cigarette Use: Never.      Tobacco Assessment            Never (less than 100 in lifetime)        Physical Examination   Vital Signs   7/14/2021 9:03 AM CDT Temperature Tympanic 97.3 DegF  LOW    Peripheral Pulse Rate 62 bpm    Systolic Blood Pressure 108 mmHg    Diastolic Blood Pressure 66 mmHg    Mean Arterial Pressure 80 mmHg    BP Site Right arm    BP Method Manual    Oxygen Saturation 99 %      Measurements from flowsheet : Measurements   7/14/2021 9:03 AM CDT Height Measured - Standard 61.25 in    Weight Measured - Standard 154 lb    BSA 1.74 m2    Body Mass Index 28.86 kg/m2  HI      General:  Alert and oriented, No acute distress.    Eye:  Pupils are equal, round and reactive to light, Extraocular movements are intact, Normal conjunctiva, left upper eyelid with 2mm chalazion.    HENT:  Normocephalic, Tympanic membranes are clear, Oral mucosa is moist, No pharyngeal erythema.    Neck:  Supple, Non-tender, No lymphadenopathy, No thyromegaly.    Respiratory:  Lungs are clear to auscultation.    Cardiovascular:  Normal rate, Regular rhythm.    Breast:  No mass, No tenderness, No discharge.    Gastrointestinal:  Soft, Non-tender, Non-distended, No organomegaly.    Musculoskeletal:  Normal range of motion, Normal strength.    Integumentary:  Warm, Dry, Pink, no concerning lesions (does have dark moles presenton back that are regular, raised lesions that patient noted appear benign), patient with diffuse dry scaly tan minimally raised patches on extremities.    Neurologic:  Alert, Oriented, Normal sensory.     Psychiatric:  Cooperative, Appropriate mood & affect.       Impression and Plan   Diagnosis     Well adult exam (RFS42-GA Z00.00).     Course:  Progressing as expected.    Patient Instructions:       Counseled: Patient, BMI, diet, and exercise.    Diagnosis     Family history of rectal cancer (DGF10-RO Z80.0).     Encounter for screening colonoscopy (SID37-PB Z12.11).     Course:  referred for colonoscopy, will check with insurance about preferred locations.    Diagnosis     Fatigue (VOJ60-JZ R53.83).     Weight gain (BPC07-CV R63.5).     History of Graves' disease (CQP76-CV Z86.39).     Orders     Orders (Selected)   Outpatient Orders  Ordered (In Transit)  T3, free* ([facility]): Specimen Type: Serum, Collection Date: 07/14/21 9:30:00 CDT  T4, free* ([facility]): Specimen Type: Serum, Collection Date: 07/14/21 9:30:00 CDT  TSH* ([facility]): Specimen Type: Serum, Collection Date: 07/14/21 9:30:00 CDT  Thyroid peroxidase antibodies* ([facility]): Specimen Type: Serum, Collection Date: 07/14/21 9:30:00 CDT  Vitamin D, 25-Hydroxy, Total Immuno* ([facility]): Specimen Type: Serum, Collection Date: 07/14/21 9:30:00 CDT.     Diagnosis     Chalazion of left eye (PHM13-TP H00.14).     Course:  will trial topical antibiotic, if not improving needs to see ophthalmology.    Orders     Orders (Selected)   Prescriptions  Prescribed  erythromycin 0.5% ophthalmic ointment: 0.5 in, Eye-Both, qid, x 7 day(s), # 3.5 gm, 0 Refill(s), Type: Acute, Pharmacy: [id number], 0.5 in Eye-Both qid,x7 day(s), 61.25, in, 07/14/21 9:03:00 CDT, Height Measured, 154, lb, 07/14/21 9:03:00 CDT, Weight Measured.     Diagnosis     Rash (LQA73-VS R21).     Course:  irregular rash noted, has been spreading, will refer to dermatology.    Orders     Orders (Selected)   Outpatient Orders  Ordered  Referral (Request): 07/14/21 9:42:00 CDT, Referred to: Dermatology, Rash.

## 2022-02-16 NOTE — NURSING NOTE
PPD Reading POC Entered On:  7/28/2021 10:50 AM CDT    Performed On:  7/28/2021 10:47 AM CDT by Jackelyn Mcleod RN               PPD Reading   PPD mm of Induration :   0 mm   PPD Interpretation :   Negative   Jackelyn Mcleod RN - 7/28/2021 10:50 AM CDT

## 2022-02-16 NOTE — NURSING NOTE
Comprehensive Intake Entered On:  9/28/2021 1:17 PM CDT    Performed On:  9/28/2021 1:15 PM CDT by Kallie Trinh CMA               Summary   Chief Complaint :   Nasal congestion started 3days ago - c/o 100.4 temp today, cogh, nausea, h/a, body aches - verbal consent given for phone visit   Menstrual Status :   Menarcheal   Height Measured :   61.25 in(Converted to: 5 ft 1 in, 155.57 cm)    Kallie Trinh CMA - 9/28/2021 1:15 PM CDT   Health Status   Allergies Verified? :   Yes   Medication History Verified? :   Yes   Medical History Verified? :   Yes   Pre-Visit Planning Status :   Completed   Tobacco Use? :   Never smoker   Kallie Trinh CMA - 9/28/2021 1:15 PM CDT   Social History   Social History   (As Of: 9/28/2021 1:17:40 PM CDT)   Alcohol:        Current, Beer (12 oz), Wine (5 oz), Liquor (Hard) (1.5 oz), 1-2 times per year, 1 drinks/episode average.  2 drinks/episode maximum.  Ready to change: No.   (Last Updated: 7/16/2021 2:51:12 PM CDT by Mari Moser)          Tobacco:  Denies Tobacco Use      Never (less than 100 in lifetime)   (Last Updated: 7/14/2021 9:04:22 AM CDT by Felicitas Venegas CMA)          Electronic Cigarette/Vaping:        Electronic Cigarette Use: Never.   (Last Updated: 7/14/2021 9:04:18 AM CDT by Felicitas Venegas CMA)          Substance Abuse:  Denies Substance Abuse      Never   (Last Updated: 7/16/2021 2:51:18 PM CDT by Mari Moser)          Employment/School:        Part time, Work/School description: RN.  Highest education level: University degree(s).   (Last Updated: 7/16/2021 2:51:37 PM CDT by Mari Moser)          Home/Environment:        Marital status: .  Living situation: Home/Independent.  Feels unsafe at home: No.  Family/Friends available for support: Yes.   (Last Updated: 7/16/2021 2:52:13 PM CDT by [place], Mari)          Nutrition/Health:        Type of diet: Regular.  Wants to lose weight: Yes.  Sleeping concerns: No.  Feels highly stressed: Yes.   (Last  Updated: 7/16/2021 2:52:24 PM CDT by Mari Moser)          Exercise:        Exercise frequency: Never.   (Last Updated: 7/16/2021 2:52:30 PM CDT by Mari Moser)          Sexual:        Sexually active: Yes.  Identifies as female, Sexual orientation: Straight or heterosexual.  History of STD: No.  Contraceptive Use Details: Birth control implant.  History of sexual abuse: No.   (Last Updated: 7/16/2021 2:52:49 PM CDT by Mari Moser)

## 2022-02-16 NOTE — PROGRESS NOTES
Patient:   ESTEFANIA CARPENTER            MRN: [id number]            FIN: [id number]               Age:   41 years     Sex:  Female     :  1980   Associated Diagnoses:   Acute viral syndrome   Author:   Daren Benites MD      Visit Information      Date of Service: 2021 11:11 am  Performing Location: [facility]  Encounter#: [id number]      Primary Care Provider (PCP):  NONE ,       Referring Provider:  Daren Benites MD    NPI# [id number]   Visit type:  Telephone Encounter.    Source of history:  Patient.    Location of patient:  _home  Call Start Time:   _119  Call End Time:    _124      Chief Complaint   2021 1:15 PM CDT    Nasal congestion started 3days ago - c/o 100.4 temp today, cogh, nausea, h/a, body aches - verbal consent given for phone visit     _      History of Present Illness   Today's visit was conducted via telephone due to the COVID-19 pandemic. Patient's consent to telephone visit was obtained and documented.      Reason for visit:  _ Patient notes a 3-day history of nasal congestion cough nausea body aches headache low-grade temp of 100.4 today.  No shortness of breath.  She has had the Covid vaccine.  One of her children are sick.         Impression and Plan   Diagnosis     Acute viral syndrome (CZL05-ER B34.9).     Plan:  Patient with him viral syndrome suspicious for Covid we will have her get tested today she will isolate herself in the meantime.  Follow-up if he gets short of breath or is getting progressively worse.  .       Health Status   Allergies:    Allergic Reactions (Selected)  Severity Not Documented  Cephalexin (Hives)   Medications:  (Selected)   Documented Medications  Documented  Mirena: Intrauteral, once, 0 Refill(s), Type: Maintenance   Problem list:    All Problems  Generalized anxiety disorder / SNOMED CT 40581017 / Confirmed      Histories   Past Medical History:    Active  Generalized anxiety disorder (53898436)  Resolved  Pregnancy  (533266917): Onset on 7/27/2019 at 39 years.  Resolved on 4/25/2020 at 40 years.  Pregnancy (285916821): Onset on 4/3/2011 at 31 years.  Resolved in 2012 at 31 years.  Pregnancy (815008384): Onset on 4/3/2009 at 29 years.  Resolved in 2010 at 29 years.  Graves disease (404549356):  Resolved.  Pregnancy (056630552):  Resolved in 2011 at 30 years.   Family History:    CA - Cancer of colon  Mother: onset at 52 .     Procedure history:    Biopsy of breast - left, benign (SNOMED CT 870214876) on 12/10/2020 at 40 Years.  Augmentation mammoplasty (SNOMED CT 87697229) on 5/4/2018 at 38 Years.  Comments:  7/23/2021 9:04 AM CDT - Brit Marie  Bilateral.  Breast reduction (SNOMED CT 729127442) in 2017 at 37 Years.  Excision of cyst of breast (SNOMED CT 57072929).  Refractive surgery (SNOMED CT 2287651659).  Comments:  7/23/2021 9:06 AM CDT - Brit Marie  Bilateral.  D&C - Dilatation and curettage (SNOMED CT 6811965082).  Extraction of wisdom tooth (SNOMED CT 690893178).   Social History:        Electronic Cigarette/Vaping Assessment            Electronic Cigarette Use: Never.      Alcohol Assessment            Current, Beer (12 oz), Wine (5 oz), Liquor (Hard) (1.5 oz), 1-2 times per year, 1 drinks/episode average.  2               drinks/episode maximum.  Ready to change: No.      Tobacco Assessment: Denies Tobacco Use            Never (less than 100 in lifetime)      Substance Abuse Assessment: Denies Substance Abuse            Never      Employment and Education Assessment            Part time, Work/School description: RN.  Highest education level: University degree(s).      Home and Environment Assessment            Marital status: .  Living situation: Home/Independent.  Feels unsafe at home: No.  Family/Friends               available for support: Yes.      Nutrition and Health Assessment            Type of diet: Regular.  Wants to lose weight: Yes.  Sleeping concerns: No.  Feels highly stressed: Yes.       Exercise and Physical Activity Assessment            Exercise frequency: Never.      Sexual Assessment            Sexually active: Yes.  Identifies as female, Sexual orientation: Straight or heterosexual.  History of STD:               No.  Contraceptive Use Details: Birth control implant.  History of sexual abuse: No.        Physical Examination   Measurements from flowsheet : Measurements   9/28/2021 1:15 PM CDT    Height Measured - Standard                61.25 in

## 2022-02-16 NOTE — LETTER
(Inserted Image. Unable to display)   [address] [place] [address]  [phone] (phone) [phone] (fax)  July 15, 2021      ESTEFANIA CARPENTER      [address] [place] [address]      Dear ESTEFANIA,    Thank you for selecting [facility] for your healthcare needs. Below you will find the results of the recent tests done at our clinic.     Your lab results are listed below. Let me know if you have questions.    I realized we never examined the knee. If it isn't improving with home exercises and you would like a physical therapy referral or to discuss further, feel free to reach out.     Result Name Current Result Reference Range   Glucose Level (mg/dL)  87 7/14/2021 65 - 99   Vitamin D 25 OH (ng/mL)  48 7/14/2021 30 - 100   Cholesterol (mg/dL) ((H)) 214 7/14/2021  - <200   Non-HDL Cholesterol ((H)) 149 7/14/2021  - <130   HDL (mg/dL)  65 7/14/2021 > OR = 50 -    Cholesterol/HDL Ratio  3.3 7/14/2021  - <5.0   LDL ((H)) 134 7/14/2021    Triglyceride (mg/dL)  49 7/14/2021  - <150   T4 Free (ng/dL)  1.4 7/14/2021 0.8 - 1.8   T3 Free (pg/mL)  3.1 7/14/2021 2.3 - 4.2   TSH (mIU/L)  2.26 7/14/2021    Thyroid Peroxidase Ab (TPO) (IU/mL)  <1 7/14/2021  - <9       Please contact me or my assistant at [phone] if you have any questions or concerns.     Sincerely,        Nickolas Parham M.D.

## 2022-03-02 VITALS — BODY MASS INDEX: 28.86 KG/M2 | BODY MASS INDEX: 28.86 KG/M2 | HEIGHT: 61 IN | HEIGHT: 61 IN

## 2022-03-02 NOTE — TELEPHONE ENCOUNTER
---------------------  From: David WISEJeri   Sent: 1/21/2022 3:29:38 PM CST  Subject: [facility] Testing     Pt was seen at [facility] for covid testing per Mariano Og. 02 Sat=99%. Pt resides in  [facility]-will notify Public Health if positive.

## 2022-03-02 NOTE — TELEPHONE ENCOUNTER
---------------------  From: David WISE Jeri   Sent: 1/28/2022 3:09:07 PM CST  Subject: [facility] Testing     Pt was seen at [facility] for covid testing per Susan Lutz. 02 Sat=94%. Pt resides in [facility]-will notify Public Health if positive.

## 2022-03-02 NOTE — TELEPHONE ENCOUNTER
---------------------  From: Carissa Perales   Sent: 1/23/2022 9:05:55 AM CST  Subject: covid test     spoke to pt of negative covid result

## 2022-03-02 NOTE — NURSING NOTE
Comprehensive Intake Entered On:  1/21/2022 12:30 PM CST    Performed On:  1/21/2022 12:28 PM CST by Felicitas Bergeron               Summary   Chief Complaint :   Consent for telemed. Family tested for COVID yesterday. Woke today with sore throat, HA and runny nose.    Menstrual Status :   Menarcheal   Height Measured :   61.25 in(Converted to: 5 ft 1 in, 155.57 cm)    Felicitas Bergeron - 1/21/2022 12:28 PM CST   Health Status   Allergies Verified? :   Yes   Medication History Verified? :   Yes   Medical History Verified? :   Yes   Pre-Visit Planning Status :   Completed   Tobacco Use? :   Never smoker   Felicitas Bergeron - 1/21/2022 12:28 PM CST   Meds / Allergies   (As Of: 1/21/2022 12:30:47 PM CST)   Allergies (Active)   cephalexin  Estimated Onset Date:   Unspecified ; Reactions:   Hives ; Created By:   Felicitas Venegas CMA; Reaction Status:   Active ; Category:   Drug ; Substance:   cephalexin ; Type:   Allergy ; Updated By:   Felicitas Venegas CMA; Reviewed Date:   1/21/2022 12:29 PM CST        Medication List   (As Of: 1/21/2022 12:30:47 PM CST)   Home Meds    levonorgestrel  :   levonorgestrel ; Status:   Documented ; Ordered As Mnemonic:   Mirena ; Simple Display Line:   Intrauteral, once, 0 Refill(s) ; Catalog Code:   levonorgestrel ; Order Dt/Tm:   7/14/2021 9:06:01 AM CDT

## 2022-03-02 NOTE — TELEPHONE ENCOUNTER
---------------------  From: Bolivar PALM, Susan MOISE   To: Appointment Pool ([id number]);     Sent: 1/28/2022 10:33:34 AM CST  Subject: covid     please schedule pt and daughter Manjula haider for curbside testing today  \  thanks.    arelis should have done, please confirm they are both on scheduleyes both have been added

## 2022-03-02 NOTE — NURSING NOTE
Comprehensive Intake Entered On:  1/28/2022 9:55 AM CST    Performed On:  1/28/2022 9:53 AM CST by Felicitas Bergeron               Summary   Chief Complaint :   Pt consent to telemed visit. Cough and SOB getting worse. Had neg COVID test 1/21/22.    Menstrual Status :   Menarcheal   Height Measured :   61.25 in(Converted to: 5 ft 1 in, 155.57 cm)    Felicitas Bergeron - 1/28/2022 9:53 AM CST   Health Status   Allergies Verified? :   Yes   Medication History Verified? :   Yes   Medical History Verified? :   Yes   Pre-Visit Planning Status :   Completed   Tobacco Use? :   Former smoker   Felicitas Bergeron - 1/28/2022 9:53 AM CST   Meds / Allergies   (As Of: 1/28/2022 9:55:44 AM CST)   Allergies (Active)   cephalexin  Estimated Onset Date:   Unspecified ; Reactions:   Hives ; Created By:   Felicitas Venegas CMA; Reaction Status:   Active ; Category:   Drug ; Substance:   cephalexin ; Type:   Allergy ; Updated By:   Felicitas Venegas CMA; Reviewed Date:   1/28/2022 9:54 AM CST        Medication List   (As Of: 1/28/2022 9:55:44 AM CST)   Home Meds    levonorgestrel  :   levonorgestrel ; Status:   Documented ; Ordered As Mnemonic:   Mirena ; Simple Display Line:   Intrauteral, once, 0 Refill(s) ; Catalog Code:   levonorgestrel ; Order Dt/Tm:   7/14/2021 9:06:01 AM CDT

## 2022-03-02 NOTE — PROGRESS NOTES
Patient:   ESTEFANIA CARPENTER            MRN: [id number]            FIN: [id number]               Age:   41 years     Sex:  Female     :  1980   Associated Diagnoses:   Contact with viral disease   Author:   Mariano Og PA-C      Visit Information      Date of Service: 2022 11:08 am  Performing Location: [facility]  Encounter#: [id number]      Primary Care Provider (PCP):  NONE ,       Referring Provider:  Mariano Og PA-C    NPI# [id number]   Visit type:  Telephone Encounter.    Source of history:  Patient.    Location of patient:  Home  Call Start Time:   1230  Call End Time:    1235      Chief Complaint   Home with sore throat.       History of Present Illness   Today's visit was conducted via telephone due to the COVID-19 pandemic. Patient's consent to telephone visit was obtained and documented.      Reason for visit:  Two children with low grade fevers, URI symptoms. Her symptoms started today. Children have been tested for Covid, waiting for results. No SOB, minimal cough. No other symptoms.      Review of Systems   Eye:  Negative.    Ear/Nose/Mouth/Throat:  Nasal congestion.    Respiratory:  No shortness of breath.       Impression and Plan   Diagnosis     Contact with viral disease (EJZ21-PD Z20.828).     Patient Instructions:       Counseled: Patient, Regarding medications, Verbalized understanding.    Summary:  Patient should remain isolated until results of test return and given that tests are not 100% accurate, would be safest to assume that they are contagious with COVID-19 until their symptoms have fully resolved. Isolation is recommended for at least 7 days from the onset of symptoms and for 3 days after resolution of fevers and productive cough. This means patient should not go to work or any public areas. In addition, it is recommended at home that they separate themselves from other people and from animals as much as possible, including using a separate bathroom.  If they do need to be around others, a facemask is recommended. Frequent hand hygiene and cleaning of high touch surfaces is also recommended.   Symptoms can last for several weeks. For patients with COVID-19, they can sometimes start to improve and then get worse again. If symptoms worsen at any time, including significant shortness of breath, low oxygen levels, high fevers that cannot be controlled, or concerns for dehydration, they should seek medical care. If going to the ER, calling 911, or seeking care at the clinic, they are reminded to notify staff that they have been tested for COVID-19.  Patient also is informed that testing will be done in their car at a scheduled time. Test will be sent to an outside commercial lab and billed by that lab. [employer] cannot confirm to patient how billing will be handled by their insurance company.    Patient is also informed that testing for COVID-19 must be reported to the public health department along with contact information for the patient.  .       Health Status   Allergies:    Allergic Reactions (Selected)  Severity Not Documented  Cephalexin (Hives)   Medications:  (Selected)   Documented Medications  Documented  Mirena: Intrauteral, once, 0 Refill(s), Type: Maintenance   Problem list:    All Problems  Generalized anxiety disorder / SNOMED CT 64135545 / Confirmed  Resolved: Pregnancy / SNOMED CT 993948175  Resolved: Pregnancy / SNOMED CT 272257079  Resolved: Pregnancy / SNOMED CT 731002951  Resolved: Pregnancy / SNOMED CT 540325325  Resolved: Graves disease / SNOMED CT 941543862      Histories   Past Medical History:    Active  Generalized anxiety disorder (36521344)  Resolved  Pregnancy (768862559): Onset on 7/27/2019 at 39 years.  Resolved on 4/25/2020 at 40 years.  Pregnancy (369088893): Onset on 4/3/2011 at 31 years.  Resolved in 2012 at 31 years.  Pregnancy (957203097): Onset on 4/3/2009 at 29 years.  Resolved in 2010 at 29 years.  Graves disease  (275141853):  Resolved.  Pregnancy (393991827):  Resolved in 2011 at 30 years.   Family History:    CA - Cancer of colon  Mother: onset at 52 .     Procedure history:    Biopsy of breast - left, benign (468925253) on 12/10/2020 at 40 Years.  Augmentation mammoplasty (32514253) on 5/4/2018 at 38 Years.  Comments:  7/23/2021 9:04 AM CDT - Brit Marie  Bilateral.  Breast reduction (626669122) in 2017 at 37 Years.  Excision of cyst of breast (60587681).  Refractive surgery (4868134900).  Comments:  7/23/2021 9:06 AM CDT - Brit Marie  Bilateral.  D&C - Dilatation and curettage (6043806526).  Extraction of wisdom tooth (071209280).   Social History:        Electronic Cigarette/Vaping Assessment            Electronic Cigarette Use: Never.      Alcohol Assessment            Current, Beer (12 oz), Wine (5 oz), Liquor (Hard) (1.5 oz), 1-2 times per year, 1 drinks/episode average.  2               drinks/episode maximum.  Ready to change: No.      Tobacco Assessment: Denies Tobacco Use            Never (less than 100 in lifetime)      Substance Abuse Assessment: Denies Substance Abuse            Never      Employment and Education Assessment            Part time, Work/School description: RN.  Highest education level: University degree(s).      Home and Environment Assessment            Marital status: .  Living situation: Home/Independent.  Feels unsafe at home: No.  Family/Friends               available for support: Yes.      Nutrition and Health Assessment            Type of diet: Regular.  Wants to lose weight: Yes.  Sleeping concerns: No.  Feels highly stressed: Yes.      Exercise and Physical Activity Assessment            Exercise frequency: Never.      Sexual Assessment            Sexually active: Yes.  Identifies as female, Sexual orientation: Straight or heterosexual.  History of STD:               No.  Contraceptive Use Details: Birth control implant.  History of sexual abuse: No.        Health  Maintenance      Recommendations     Pending (in the next year)        OverDue           COVID-19 Vaccine (Pfizer) Dose 3 Booster due  06/13/21  One-time only           Influenza Vaccine due  09/01/21  and every 1  year(s)        Due            Alcohol Misuse Screen due  01/21/22  and every 1  year(s)           Cervical Cancer Screen (if sexually active) due  01/21/22  Variable frequency           HIV Screen (if sexually active) due  01/21/22  and every 1  year(s)           Intimate Partner Violence Screen due  01/21/22  and every 1  year(s)           STD Counseling (if sexually active) due  01/21/22  and every 1  year(s)           Syphilis Screen (if sexually active) due  01/21/22  and every 1  year(s)        Due In Future            Body Mass Index Check not due until  07/14/22  and every 1  year(s)           High Blood Pressure Screen not due until  07/14/22  and every 1  year(s)           Lipid Disorders Screen not due until  07/14/22  and every 1  year(s)           Depression Screen not due until  07/14/22  and every 1  year(s)     Satisfied (in the past 1 year)        Satisfied            Body Mass Index Check on  07/14/21.           Depression Screen on  07/14/21.           Depression Screen on  07/14/21.           Depression Screen on  07/14/21.           Depression Screen on  07/14/21.           Depression Screen on  07/14/21.           High Blood Pressure Screen on  07/14/21.           Lipid Disorders Screen on  07/14/21.           Lipid Disorders Screen on  07/14/21.           Lipid Disorders Screen on  07/14/21.           Lipid Disorders Screen on  07/14/21.           Tobacco Use Screen on  01/21/22.           Tobacco Use Screen on  09/28/21.           Tobacco Use Screen on  07/14/21.

## 2022-03-02 NOTE — PROGRESS NOTES
Chief Complaint    Pt consent to telemed visit. Cough and SOB getting worse. Had neg COVID test 1/21/22.  History of Present Illness          Today's visit was conducted via video due to the COVID-19 pandemic. PT consent to video visit was obtained and documented       Call Start Time:  10:24      Call End Time:    1035      Provider location: clinic office      Pt location:  home in [place]       Exposed to Covid 19 possible by daughter.      Facial congestion, 7 days approximately, but got better and then worse.       Tested 1 week ago but worse since then.       congested and coughing.  mild sob with activity. no fevers. no vomiting or diarrhea. no rash.  Tolerating po fluids well.  .          Review of Systems      Review of systems is negative with the exception of those noted in HPI   Physical Exam   Vitals & Measurements    HT: 61.25 in       nad appears well      able to talk in full sentences.   Assessment/Plan       Acute URI (J06.9)         covid 19 testing at curbside.   Push fluids, rest and ibuprofen or tylenol for comfort.  Pt instructed to return to clinic for persistent or worsening symptoms.           Ordered:          SARS-CoV-2 RNA (COVID-19), Qualitative NAAT (Request), Acute URI           Patient Information     Name:ESTEFANIA CARPENTER      Address:      [address] [place] [phone]     Sex:Female     YOB: 1980     [phone]     Emergency Contact:AMI TAPIA     MRN:[id number]     FIN:[id number]     Location:[facility]     Date of Service:01/28/2022      Primary Care Physician:       NONE ,       Attending Physician:       Bolivar PALM, Susan MOISE, [phone]  Problem List/Past Medical History    Ongoing     Generalized anxiety disorder    Historical     Graves disease     Pregnancy     Pregnancy     Pregnancy     Pregnancy  Procedure/Surgical History     Biopsy of breast - left, benign (12/10/2020)     Augmentation mammoplasty (05/04/2018)       Comments: Bilateral..     Breast reduction (2017)     D&C - Dilatation and curettage     Excision of cyst of breast     Extraction of wisdom tooth     Refractive surgery      Comments: Bilateral..  Medications    Mirena, Intrauteral, once  Allergies    cephalexin (Hives)  Social History    Smoking Status     Former smoker     Alcohol      Current, Beer (12 oz), Wine (5 oz), Liquor (Hard) (1.5 oz), 1-2 times per year, 1 drinks/episode average. 2 drinks/episode maximum. Ready to change: No.     Electronic Cigarette/Vaping      Electronic Cigarette Use: Never.     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     Exercise      Exercise frequency: Never.     Home/Environment      Marital status: . Living situation: Home/Independent. Feels unsafe at home: No. Family/Friends available for support: Yes.     Nutrition/Health      Type of diet: Regular. Wants to lose weight: Yes. Sleeping concerns: No. Feels highly stressed: Yes.     Sexual      Sexually active: Yes. Identifies as female, Sexual orientation: Straight or heterosexual. History of STD: No. Contraceptive Use Details: Birth control implant. History of sexual abuse: No.     Substance Abuse - Denies Substance Abuse      Never     Tobacco - Denies Tobacco Use      Never (less than 100 in lifetime)  Family History    CA - Cancer of colon: Mother (Dx at 52).  Lab Results       Lab Results (Last 4 results within 90 days)        Coronavirus SARS-CoV-2 (COVID-19) TR: Negative (01/21/22 12:30:00)  Immunizations       Scheduled Immunizations       Dose Date(s)       influenza virus vaccine, inactivated       10/05/2011, 08/30/2012, 10/10/2012, 10/01/2015, 09/12/2016, 10/13/2019, 10/03/2020       MMR (measles/mumps/rubella)       09/01/2012, 04/26/2020       SARS-CoV-2 (COVID-19) Pfizer-162b2       12/23/2020, 01/12/2021       Td       12/30/1999       tetanus/diphth/pertuss (Tdap) adult/adol        04/13/2007, 09/01/2012

## 2022-03-02 NOTE — TELEPHONE ENCOUNTER
---------------------  From: Ivy Sandoval CMA   Sent: 1/29/2022 11:08:57 AM CST  Subject: COVID results     LM at 1108 stating negative COVID results.

## 2022-03-10 PROBLEM — R07.89 CHEST WALL PAIN: Status: ACTIVE | Noted: 2020-02-12

## 2022-03-10 PROBLEM — F43.22 ADJUSTMENT DISORDER WITH ANXIOUS MOOD: Status: ACTIVE | Noted: 2018-10-23

## 2022-03-10 RX ORDER — ONDANSETRON 4 MG/1
4 TABLET, FILM COATED ORAL
COMMUNITY
End: 2023-10-12

## 2022-03-10 RX ORDER — SODIUM PHOSPHATE,MONO-DIBASIC 19G-7G/118
2 ENEMA (ML) RECTAL
COMMUNITY
End: 2023-10-12

## 2022-03-10 RX ORDER — DIPHENHYDRAMINE HYDROCHLORIDE 25 MG/1
10000 TABLET ORAL
COMMUNITY
End: 2022-12-05

## 2022-03-10 RX ORDER — MULTIVITAMIN,THERAPEUTIC
1 TABLET ORAL
COMMUNITY

## 2022-03-10 RX ORDER — PRENATAL 105/IRON/FOLIC AC/DHA 30-1.4-3
1 COMBINATION PACKAGE (EA) ORAL
COMMUNITY
End: 2023-10-12

## 2022-03-10 RX ORDER — OMEGA-3 FATTY ACIDS/FISH OIL 300-1000MG
2 CAPSULE ORAL
COMMUNITY

## 2022-03-11 ENCOUNTER — VIRTUAL VISIT (OUTPATIENT)
Dept: FAMILY MEDICINE | Facility: CLINIC | Age: 42
End: 2022-03-11
Payer: COMMERCIAL

## 2022-03-11 DIAGNOSIS — Z20.822 SUSPECTED COVID-19 VIRUS INFECTION: Primary | ICD-10-CM

## 2022-03-11 PROCEDURE — 99213 OFFICE O/P EST LOW 20 MIN: CPT | Mod: 95 | Performed by: INTERNAL MEDICINE

## 2022-03-11 RX ORDER — ESCITALOPRAM OXALATE 10 MG/1
10 TABLET ORAL DAILY
COMMUNITY
Start: 2022-02-10 | End: 2023-10-12

## 2022-03-11 NOTE — PROGRESS NOTES
"Brandy is a 41 year old who is being evaluated via a billable telephone visit.      What phone number would you like to be contacted at? [phone]  How would you like to obtain your AVS? Mail a copy    Assessment & Plan     COVID-19 virus infection  COVID-19 with illness beginning March 1, 2022 and positive test March 2, 2022.  Patient is just beginning to improve in the last day or 2.  Works as a NICU nurse.  Return to work March 15, 2022          I do not see a       Estimated body mass index is 28.86 kg/m  as calculated from the following:    Height as of 1/28/22: 1.556 m (5' 1.25\").    Weight as of 7/14/21: 69.9 kg (154 lb).  GFR Estimate   Date Value Ref Range Status   02/12/2020 >60 >60 mL/min/1.73m2 Final     Lab Results   Component Value Date    DGOUJ11ACI Negative 01/28/2022       No follow-ups on file.    Jose Eid MD  [facility]    Subjective   Brandy is a 41 year old who presents for the following health issues     HPI     COVID Followup:     Date of visit: 3-2-22    Current Status: Patient still symptomatic and coughing but needs extended leave from working this weekend.  Fully Vaccinated.  Cough, diarrhea 3x day, night sweats, headache, short of breath, fatigue, and nasal congestion.  Balance feels off at times.  Has to catch herself from losing it.           Review of Systems   No dyspnea.  Night sweats and fever improving in the last couple of days.  Her entire family was ill.      Objective           Vitals:  No vitals were obtained today due to virtual visit.    Physical Exam   healthy, alert and no distress  PSYCH: Alert and oriented times 3; coherent speech, normal   rate and volume, able to articulate logical thoughts, able   to abstract reason, no tangential thoughts, no hallucinations   or delusions  Her affect is normal and pleasant  RESP: No cough, no audible wheezing, able to talk in full sentences  Remainder of exam unable to be completed due to " telephone visits    Covid PCR + March 2 2022 - influenza results.            Phone call duration: 10 minutes

## 2022-03-11 NOTE — LETTER
[facility]  [address] [address]  [phone]          March 11, 2022    RE:  Brandy Daniel                                                                                                                                                       [address] [facility] [address]            To whom it may concern:    Brandy Daniel is under my professional care for Suspected COVID-19 virus infection She  may return to work with the following: The employee is UNABLE to return to work until 3/15/2022      Sincerely,        Jose Eid MD

## 2022-12-02 ENCOUNTER — ALLIED HEALTH/NURSE VISIT (OUTPATIENT)
Dept: FAMILY MEDICINE | Facility: CLINIC | Age: 42
End: 2022-12-02
Payer: COMMERCIAL

## 2022-12-02 DIAGNOSIS — Z11.1 VISIT FOR MANTOUX TEST: Primary | ICD-10-CM

## 2022-12-02 PROCEDURE — 86580 TB INTRADERMAL TEST: CPT

## 2022-12-02 PROCEDURE — 99207 PR NO CHARGE NURSE ONLY: CPT

## 2022-12-02 NOTE — PROGRESS NOTES
Patient is here today for a Mantoux (TST) test placement.    Is there a current order in the chart? Yes    Reason for Mantoux (TST) in patient's own words: work- [employer]    Patient needs form signed? No - form not needed per patient.    Instructed patient to wait for 15 minutes post injection and to report any reactions immediately to staff.    Told patient to return to clinic in 48-72 hours to have Mantoux (TST) read.

## 2022-12-05 ENCOUNTER — NURSE TRIAGE (OUTPATIENT)
Dept: NURSING | Facility: CLINIC | Age: 42
End: 2022-12-05

## 2022-12-05 ENCOUNTER — E-VISIT (OUTPATIENT)
Dept: FAMILY MEDICINE | Facility: CLINIC | Age: 42
End: 2022-12-05

## 2022-12-05 ENCOUNTER — TELEPHONE (OUTPATIENT)
Dept: INTERNAL MEDICINE | Facility: CLINIC | Age: 42
End: 2022-12-05

## 2022-12-05 DIAGNOSIS — R09.81 CONGESTION OF PARANASAL SINUS: Primary | ICD-10-CM

## 2022-12-05 DIAGNOSIS — Z20.822 SUSPECTED COVID-19 VIRUS INFECTION: ICD-10-CM

## 2022-12-05 PROCEDURE — 99421 OL DIG E/M SVC 5-10 MIN: CPT | Mod: CS | Performed by: FAMILY MEDICINE

## 2022-12-05 RX ORDER — DOXYCYCLINE HYCLATE 100 MG
100 TABLET ORAL 2 TIMES DAILY
Qty: 14 TABLET | Refills: 0 | Status: SHIPPED | OUTPATIENT
Start: 2022-12-05 | End: 2022-12-12

## 2022-12-05 NOTE — TELEPHONE ENCOUNTER
General Call      Reason for Call:  TB form    What are your questions or concerns:    Needs form faxed- she will get her Mantoux read at work Today.    Fax - [phone] KRISTIN verde    Date of last appointment with provider:   12/2/2022    Okay to leave a detailed message?: Yes at Cell number on file:    Telephone Information:   Mobile [phone]

## 2022-12-05 NOTE — TELEPHONE ENCOUNTER
Call from patient who says she just did an e-visit and was ordered the COVID-19 test.    She also wants flu and RSV testing too.      Jarod Bush RN, BSN  Triage Nurse Advisor    Reason for Disposition    Nursing judgment    Protocols used: INFORMATION ONLY CALL - NO TRIAGE-A-OH

## 2022-12-05 NOTE — PATIENT INSTRUCTIONS
Dear Brandy,    Based on your responses, you may another viral infection which could be COVID-19. This illness can cause fever, cough and trouble breathing. Many people get a mild case and get better on their own. Some people can get very sick. It also sounds like you may have developed a sius infection. We could try treating this h doxycycline. I sent this in. We can also test you for covid     Will I be tested for COVID-19?  We would like to test you for COVID-19 virus. I have placed orders for this test.     For all employees or close contacts (except [place] and [employer] - see below), go to your [employer] home page and scroll down to the section that says  You have an appointment that needs to be scheduled  and click the large green button that says  Schedule Now  and follow the steps to find the next available opening.     If you are unable to complete these steps or if you cannot find any available times, please call [phone] to schedule employee testing.     [place] employees or close contacts, please call [phone].   [place] ([employer]) employees or close contacts call [phone].    Return to work guidance:  Please let your workplace manager and staffing office know when your isolation ends. Note: if you tested through [facility], there is no need to report to [facility]. If you did not test through [employer], send a copy of your results to [email or web address]. [place] call [phone], [place] call [phone].     Please visit the Employee COVID-19 Testing Information page on the COVID-19 SharePoint site. Here you will find return to work and testing guidance, high and low risk exposure definitions, and frequently asked questions.   [employer] URL: [email or web address]     How can I take care of myself?  Over the counter medications may help with your symptoms such as runny or stuffy nose, cough,  chills, or fever.  Talk to your care team about your options.     Some people are at high risk of severe illness (for example, you have a weak immune system, you re 65 years or older, or you have certain medical problems). If your risk is high and your symptoms started in the last 5 days, we strongly recommend for you to get COVID treatment as soon as possible. Paxlovid and Molnupiravir are proven safe and effective, make you feel better faster, and prevent hospitalization and death.       To schedule an appointment to discuss COVID treatment, request an appointment on [employer] (select  COVID-19 Treatment ) or call [address] [place] [address] ([phone])      Get lots of rest. Drink extra fluids (unless a doctor has told you not to)    Take Tylenol (acetaminophen) or ibuprofen for fever or pain. If you have liver or kidney problems, ask your family doctor if it's okay to take Tylenol o ibuprofen    Take over the counter medications for your symptoms, as directed by your doctor. You may also talk to your pharmacist.      If you have other health problems (like cancer, heart failure, an organ transplant or severe kidney disease): Call your specialty clinic if you don't feel better in the next 2 days.    Know when to call 911. Emergency warning signs include:  o Trouble breathing or shortness of breath  o Pain or pressure in the chest that doesn't go away  o Feeling confused like you haven't felt before, or not being able to wake up  o Bluish-colored lips or face    Where can I get more information?    [facility] - About COVID-19: [email or web address]     CDC - What to Do If You're Sick: www.cdc.gov/coronavirus/2019-ncov/about/steps-when-sick.html    CDC -  Isolation https://www.cdc.gov/coronavirus/2019-ncov/your-health/isolation.html

## 2022-12-06 ENCOUNTER — ALLIED HEALTH/NURSE VISIT (OUTPATIENT)
Dept: FAMILY MEDICINE | Facility: CLINIC | Age: 42
End: 2022-12-06
Payer: COMMERCIAL

## 2022-12-06 DIAGNOSIS — R09.81 CONGESTION OF PARANASAL SINUS: ICD-10-CM

## 2022-12-06 DIAGNOSIS — Z20.822 SUSPECTED COVID-19 VIRUS INFECTION: ICD-10-CM

## 2022-12-06 LAB
FLUAV AG SPEC QL IA: NEGATIVE
FLUBV AG SPEC QL IA: NEGATIVE
SARS-COV-2 RNA RESP QL NAA+PROBE: NEGATIVE

## 2022-12-06 PROCEDURE — 87804 INFLUENZA ASSAY W/OPTIC: CPT | Mod: QW

## 2022-12-06 PROCEDURE — U0005 INFEC AGEN DETEC AMPLI PROBE: HCPCS

## 2022-12-06 PROCEDURE — U0003 INFECTIOUS AGENT DETECTION BY NUCLEIC ACID (DNA OR RNA); SEVERE ACUTE RESPIRATORY SYNDROME CORONAVIRUS 2 (SARS-COV-2) (CORONAVIRUS DISEASE [COVID-19]), AMPLIFIED PROBE TECHNIQUE, MAKING USE OF HIGH THROUGHPUT TECHNOLOGIES AS DESCRIBED BY CMS-2020-01-R: HCPCS

## 2022-12-06 PROCEDURE — 99207 PR NON-BILLABLE SERV PER CHARTING: CPT | Mod: 25

## 2022-12-06 NOTE — TELEPHONE ENCOUNTER
Patient came in for flu and COVID-19 testing today.      Jarod Bush RN, BSN  Triage Nurse Advisor

## 2022-12-26 ENCOUNTER — HEALTH MAINTENANCE LETTER (OUTPATIENT)
Age: 42
End: 2022-12-26

## 2023-02-06 ENCOUNTER — TRANSFERRED RECORDS (OUTPATIENT)
Dept: HEALTH INFORMATION MANAGEMENT | Facility: CLINIC | Age: 43
End: 2023-02-06

## 2023-02-13 ENCOUNTER — ANCILLARY ORDERS (OUTPATIENT)
Dept: SCHEDULING | Facility: CLINIC | Age: 43
End: 2023-02-13

## 2023-02-13 DIAGNOSIS — Z12.31 VISIT FOR SCREENING MAMMOGRAM: ICD-10-CM

## 2023-02-27 ENCOUNTER — ANCILLARY ORDERS (OUTPATIENT)
Dept: SCHEDULING | Facility: CLINIC | Age: 43
End: 2023-02-27

## 2023-02-27 ENCOUNTER — HOSPITAL ENCOUNTER (OUTPATIENT)
Dept: MAMMOGRAPHY | Facility: CLINIC | Age: 43
Discharge: HOME | End: 2023-02-27
Attending: OBSTETRICS & GYNECOLOGY | Admitting: OBSTETRICS & GYNECOLOGY
Payer: COMMERCIAL

## 2023-02-27 DIAGNOSIS — Z12.31 VISIT FOR SCREENING MAMMOGRAM: ICD-10-CM

## 2023-02-27 PROCEDURE — 77067 SCR MAMMO BI INCL CAD: CPT

## 2023-04-07 ENCOUNTER — LAB (OUTPATIENT)
Dept: FAMILY MEDICINE | Facility: CLINIC | Age: 43
End: 2023-04-07
Payer: COMMERCIAL

## 2023-04-07 DIAGNOSIS — Z20.822 SUSPECTED COVID-19 VIRUS INFECTION: ICD-10-CM

## 2023-04-07 LAB — SARS-COV-2 RNA RESP QL NAA+PROBE: NEGATIVE

## 2023-04-07 PROCEDURE — U0005 INFEC AGEN DETEC AMPLI PROBE: HCPCS

## 2023-04-07 PROCEDURE — U0003 INFECTIOUS AGENT DETECTION BY NUCLEIC ACID (DNA OR RNA); SEVERE ACUTE RESPIRATORY SYNDROME CORONAVIRUS 2 (SARS-COV-2) (CORONAVIRUS DISEASE [COVID-19]), AMPLIFIED PROBE TECHNIQUE, MAKING USE OF HIGH THROUGHPUT TECHNOLOGIES AS DESCRIBED BY CMS-2020-01-R: HCPCS

## 2023-07-10 ENCOUNTER — NURSE TRIAGE (OUTPATIENT)
Dept: NURSING | Facility: CLINIC | Age: 43
End: 2023-07-10
Payer: COMMERCIAL

## 2023-07-11 NOTE — TELEPHONE ENCOUNTER
Patient called with concerns of cough and positive covid test at home, declined paxlovid.     Her primary concern is if she needed a PCR for her job she was encouraged to speak with her employer, she agreed.     COVID Positive/Requesting COVID treatment    Patient is positive for COVID and requesting treatment options.    Date of positive COVID test (PCR or at home)? 7/10/2023  Current COVID symptoms: cough, sore throat and congestion or runny nose  Date COVID symptoms began: 7/9/232    Patient refused Paxlovid    Reason for Disposition    [1] HIGH RISK for severe COVID complications (e.g., weak immune system, age > 64 years, obesity with BMI of 30 or higher, pregnant, chronic lung disease or other chronic medical condition) AND [2] COVID symptoms (e.g., cough, fever)  (Exceptions: Already seen by PCP and no new or worsening symptoms.)    Additional Information    Negative: SEVERE difficulty breathing (e.g., struggling for each breath, speaks in single words)    Negative: Difficult to awaken or acting confused (e.g., disoriented, slurred speech)    Negative: Bluish (or gray) lips or face now    Negative: Shock suspected (e.g., cold/pale/clammy skin, too weak to stand, low BP, rapid pulse)    Negative: Sounds like a life-threatening emergency to the triager    Negative: [1] Diagnosed or suspected COVID-19 AND [2] symptoms lasting 3 or more weeks    Negative: [1] COVID-19 exposure AND [2] no symptoms    Negative: COVID-19 vaccine reaction suspected (e.g., fever, headache, muscle aches) occurring 1 to 3 days after getting vaccine    Negative: COVID-19 vaccine, questions about    Negative: [1] Lives with someone known to have influenza (flu test positive) AND [2] flu-like symptoms (e.g., cough, runny nose, sore throat, SOB; with or without fever)    Negative: [1] Adult with possible COVID-19 symptoms AND [2] triager concerned about severity of symptoms or other causes    Negative: COVID-19 and breastfeeding, questions  about    Negative: SEVERE or constant chest pain or pressure  (Exception: Mild central chest pain, present only when coughing.)    Negative: MODERATE difficulty breathing (e.g., speaks in phrases, SOB even at rest, pulse 100-120)    Negative: Headache and stiff neck (can't touch chin to chest)    Negative: Oxygen level (e.g., pulse oximetry) 90 percent or lower    Negative: Chest pain or pressure  (Exception: MILD central chest pain, present only when coughing)    Negative: Patient sounds very sick or weak to the triager    Negative: MILD difficulty breathing (e.g., minimal/no SOB at rest, SOB with walking, pulse <100)    Negative: Fever > 103 F (39.4 C)    Negative: [1] Fever > 101 F (38.3 C) AND [2] over 60 years of age    Negative: [1] Fever > 100.0 F (37.8 C) AND [2] bedridden (e.g., nursing home patient, CVA, chronic illness, recovering from surgery)    Protocols used: CORONAVIRUS (COVID-19) DIAGNOSED OR [id number]

## 2023-08-14 ENCOUNTER — OFFICE VISIT (OUTPATIENT)
Dept: FAMILY MEDICINE | Facility: CLINIC | Age: 43
End: 2023-08-14
Payer: COMMERCIAL

## 2023-08-14 VITALS
BODY MASS INDEX: 28.17 KG/M2 | HEIGHT: 62 IN | TEMPERATURE: 97.2 F | OXYGEN SATURATION: 97 % | DIASTOLIC BLOOD PRESSURE: 80 MMHG | SYSTOLIC BLOOD PRESSURE: 130 MMHG | HEART RATE: 72 BPM | RESPIRATION RATE: 16 BRPM

## 2023-08-14 DIAGNOSIS — N76.0 BACTERIAL VAGINOSIS: Primary | ICD-10-CM

## 2023-08-14 DIAGNOSIS — R35.0 URINARY FREQUENCY: ICD-10-CM

## 2023-08-14 DIAGNOSIS — B96.89 BACTERIAL VAGINOSIS: Primary | ICD-10-CM

## 2023-08-14 LAB
ALBUMIN UR-MCNC: NEGATIVE MG/DL
APPEARANCE UR: CLEAR
BACTERIA #/AREA URNS HPF: ABNORMAL /HPF
BILIRUB UR QL STRIP: NEGATIVE
CLUE CELLS: PRESENT
COLOR UR AUTO: YELLOW
GLUCOSE UR STRIP-MCNC: NEGATIVE MG/DL
HGB UR QL STRIP: ABNORMAL
KETONES UR STRIP-MCNC: NEGATIVE MG/DL
LEUKOCYTE ESTERASE UR QL STRIP: NEGATIVE
NITRATE UR QL: NEGATIVE
PH UR STRIP: 6.5 [PH] (ref 5–7)
RBC #/AREA URNS AUTO: ABNORMAL /HPF
SP GR UR STRIP: 1.02 (ref 1–1.03)
SQUAMOUS #/AREA URNS AUTO: ABNORMAL /LPF
TRICHOMONAS, WET PREP: ABNORMAL
UROBILINOGEN UR STRIP-ACNC: 0.2 E.U./DL
WBC #/AREA URNS AUTO: ABNORMAL /HPF
WBC'S/HIGH POWER FIELD, WET PREP: ABNORMAL
YEAST, WET PREP: ABNORMAL

## 2023-08-14 PROCEDURE — 99213 OFFICE O/P EST LOW 20 MIN: CPT | Performed by: FAMILY MEDICINE

## 2023-08-14 PROCEDURE — 87210 SMEAR WET MOUNT SALINE/INK: CPT | Mod: QW | Performed by: FAMILY MEDICINE

## 2023-08-14 PROCEDURE — 81001 URINALYSIS AUTO W/SCOPE: CPT | Performed by: FAMILY MEDICINE

## 2023-08-14 PROCEDURE — 87086 URINE CULTURE/COLONY COUNT: CPT | Performed by: FAMILY MEDICINE

## 2023-08-14 RX ORDER — METRONIDAZOLE 500 MG/1
500 TABLET ORAL 2 TIMES DAILY
Qty: 14 TABLET | Refills: 0 | Status: SHIPPED | OUTPATIENT
Start: 2023-08-14 | End: 2023-08-21

## 2023-08-14 NOTE — PROGRESS NOTES
Clinical Decision Making:    At the end of the encounter, I discussed results, diagnosis, medications. Discussed red flags for immediate return to clinic/ER, as well as indications for follow up if no improvement. Patient understood and agreed to plan. Patient was stable for discharge.      ICD-10-CM    1. Bacterial vaginosis  N76.0 metroNIDAZOLE (FLAGYL) 500 MG tablet    B96.89       2. Urinary frequency  R35.0 UA Macroscopic with reflex to Microscopic and Culture - Lab Collect     Urine Culture Aerobic Bacterial - lab collect     UA Macroscopic with reflex to Microscopic and Culture - Lab Collect     Urine Culture Aerobic Bacterial - lab collect     UA Microscopic with Reflex to Culture     Wet prep - Clinic Collect        Treating with metronidazole 500 mg twice daily for 7 days  Discussed no alcohol while taking this medication  Follow-up if not improving as anticipated  She is scheduled for an annual exam with her primary next week      There are no Patient Instructions on file for this visit.   No follow-ups on file.      chief complaint    HPI:  Brandy Daniel is a 43 year old female who presents today complaining of urinary frequency and urgency for about a week.  Her lower abdomen feels achy and she is having some low back pain.  She feels like her bladder is incompletely emptying because she has to urinate frequently.  She denies dysuria and no blood in her urine.  No fevers or nausea.  She denies vaginal itching, discharge, odor.  She has no concern about sexually transmitted infections.  She has had an IUD in place for about 3-1/2 years and does not get her menstrual cycle.    History obtained from the patient.    Problem List:  2020-04: Term pregnancy  2020-04: Spontaneous vaginal delivery  2020-02: Chest wall pain  2020-02: Encounter for triage in pregnant patient  2020-02: Indication for care in labor or delivery  2019-09: Hyperemesis gravidarum with dehydration  2018-10: Adjustment disorder with  "anxious mood  2014-09: Screening for malignant neoplasm of cervix  2013-11: IUD (intrauterine device) in place  2012-08: Supervision of normal pregnancy  2010-09: Chondromalacia of patella  2010-09: Ganglion, joint  2010-09: Pes planus      Past Medical History:   Diagnosis Date    Abnormal Pap smear     cryo    Anxiety     Disease of thyroid gland     Graves Disease asymptomatic       Social History     Tobacco Use    Smoking status: Never    Smokeless tobacco: Never   Substance Use Topics    Alcohol use: Yes     Alcohol/week: 2.0 standard drinks of alcohol       Review of systems  negative except listed in HPI    Vitals:    08/14/23 1719   BP: 130/80   BP Location: Right arm   Patient Position: Sitting   Pulse: 72   Resp: 16   Temp: 97.2  F (36.2  C)   TempSrc: Tympanic   SpO2: 97%   Height: 1.575 m (5' 2\")       Physical Exam  Vitals noted and within normal limits.  In general patient is alert, oriented and in no acute distress.  Back with no CVA tenderness.  Abdomen soft, non-tender and not distended.  UA is within normal limits  Wet prep is positive for clue cells  Results for orders placed or performed in visit on 08/14/23   UA Macroscopic with reflex to Microscopic and Culture - Lab Collect     Status: Abnormal    Specimen: Urine, Clean Catch   Result Value Ref [place]    Color Urine Yellow Colorless, Straw, Light Yellow, Yellow    Appearance Urine Clear Clear    Glucose Urine Negative Negative mg/dL    Bilirubin Urine Negative Negative    Ketones Urine Negative Negative mg/dL    Specific Gravity Urine 1.020 1.003 - 1.035    Blood Urine Small (A) Negative    pH Urine 6.5 5.0 - 7.0    Protein Albumin Urine Negative Negative mg/dL    Urobilinogen Urine 0.2 0.2, 1.0 E.U./dL    Nitrite Urine Negative Negative    Leukocyte Esterase Urine Negative Negative   UA Microscopic with Reflex to Culture     Status: Abnormal   Result Value Ref [place]    Bacteria Urine None Seen None Seen /HPF    RBC Urine 0-2 0-2 /HPF /HPF    " WBC Urine None Seen 0-5 /HPF /HPF    Squamous Epithelials Urine Few (A) None Seen /LPF    Narrative    Urine Culture not indicated   Wet prep - Clinic Collect     Status: Abnormal    Specimen: Vagina; Swab   Result Value Ref [place]    Trichomonas Absent Absent    Yeast Absent Absent    Clue Cells Present (A) Absent    WBCs/high power field 1+ (A) None         Answers submitted by the patient for this visit:  General Questionnaire (Submitted on 8/14/2023)  Chief Complaint: Chronic problems general questions HPI Form  How many servings of fruits and vegetables do you eat daily?: 2-3  On average, how many sweetened beverages do you drink each day (Examples: soda, juice, sweet tea, etc.  Do NOT count diet or artificially sweetened beverages)?: 0  How many minutes a day do you exercise enough to make your heart beat faster?: 9 or less  How many days a week do you exercise enough to make your heart beat faster?: 3 or less  How many days per week do you miss taking your medication?: 0  General Concern (Submitted on 8/14/2023)  Chief Complaint: Chronic problems general questions HPI Form  What is the reason for your visit today?: possible bladder infection  When did your symptoms begin?: 3-7 days ago

## 2023-08-16 LAB — BACTERIA UR CULT: NO GROWTH

## 2023-08-30 ENCOUNTER — OFFICE VISIT (OUTPATIENT)
Dept: FAMILY MEDICINE | Facility: CLINIC | Age: 43
End: 2023-08-30
Payer: COMMERCIAL

## 2023-08-30 VITALS
SYSTOLIC BLOOD PRESSURE: 100 MMHG | BODY MASS INDEX: 29.81 KG/M2 | HEIGHT: 62 IN | WEIGHT: 162 LBS | OXYGEN SATURATION: 97 % | TEMPERATURE: 97.8 F | DIASTOLIC BLOOD PRESSURE: 60 MMHG | HEART RATE: 62 BPM | RESPIRATION RATE: 16 BRPM

## 2023-08-30 DIAGNOSIS — F43.9 STRESS: ICD-10-CM

## 2023-08-30 DIAGNOSIS — Z13.1 SCREENING FOR DIABETES MELLITUS: ICD-10-CM

## 2023-08-30 DIAGNOSIS — F41.9 ANXIETY: Primary | ICD-10-CM

## 2023-08-30 DIAGNOSIS — Z11.59 NEED FOR HEPATITIS C SCREENING TEST: ICD-10-CM

## 2023-08-30 DIAGNOSIS — Z86.39 HISTORY OF GRAVES' DISEASE: ICD-10-CM

## 2023-08-30 DIAGNOSIS — Z13.220 LIPID SCREENING: ICD-10-CM

## 2023-08-30 PROCEDURE — 90715 TDAP VACCINE 7 YRS/> IM: CPT

## 2023-08-30 PROCEDURE — 99396 PREV VISIT EST AGE 40-64: CPT | Mod: 25

## 2023-08-30 PROCEDURE — 90471 IMMUNIZATION ADMIN: CPT

## 2023-08-30 PROCEDURE — 99214 OFFICE O/P EST MOD 30 MIN: CPT | Mod: 25

## 2023-08-30 RX ORDER — ESCITALOPRAM OXALATE 20 MG/1
20 TABLET ORAL DAILY
Qty: 30 TABLET | Refills: 1 | Status: SHIPPED | OUTPATIENT
Start: 2023-08-30 | End: 2023-12-04

## 2023-08-30 ASSESSMENT — ENCOUNTER SYMPTOMS
MYALGIAS: 0
DIZZINESS: 0
CHILLS: 0
DIARRHEA: 0
HEARTBURN: 0
SHORTNESS OF BREATH: 0
PARESTHESIAS: 0
NAUSEA: 0
HEADACHES: 1
EYE PAIN: 0
PALPITATIONS: 0
FEVER: 0
HEMATOCHEZIA: 0
HEMATURIA: 0
CONSTIPATION: 0
JOINT SWELLING: 0
NERVOUS/ANXIOUS: 1
DYSURIA: 0
SORE THROAT: 0
COUGH: 0
ABDOMINAL PAIN: 0
BREAST MASS: 0
FREQUENCY: 0
WEAKNESS: 0
ARTHRALGIAS: 1

## 2023-08-30 NOTE — PROGRESS NOTES
SUBJECTIVE:   CC: Brandy is an 43 year old who presents for preventive health visit.       8/30/2023     2:16 PM   Additional Questions   Roomed by Robyn       Has gained 30lbs in past 2 years.  Discussed weight gain and possible causes, as well as possible treatments.  Patient would like to do lab testing and may be reevaluated at next visit.    The 10-year ASCVD risk score (Angeline LR, et al., 2019) is: 0.3%    Values used to calculate the score:      Age: 43 years      Sex: Female      Is Non- : No      Diabetic: No      Tobacco smoker: No      Systolic Blood Pressure: 100 mmHg      Is BP treated: No      HDL Cholesterol: 65 mg/dL      Total Cholesterol: 214 mg/dL      Healthy Habits:     Getting at least 3 servings of Calcium per day:  Yes    Bi-annual eye exam:  Yes    Dental care twice a year:  Yes    Sleep apnea or symptoms of sleep apnea:  None    Diet:  Regular (no restrictions)    Frequency of exercise:  None    Taking medications regularly:  Yes    Medication side effects:  None    Additional concerns today:  No      Today's PHQ-2 Score:       8/30/2023     2:12 PM   PHQ-2 ( 1999 Pf[employer])   Q1: Little interest or pleasure in doing things 0   Q2: Feeling down, depressed or hopeless 0   PHQ-2 Score 0   Q1: Little interest or pleasure in doing things Not at all   Q2: Feeling down, depressed or hopeless Not at all   PHQ-2 Score 0       Have you ever done Advance Care Planning? (For example, a Health Directive, POLST, or a discussion with a medical provider or your loved ones about your wishes): No, advance care planning information given to patient to review.  Patient plans to discuss their wishes with loved ones or provider.      Social History     Tobacco Use    Smoking status: Never    Smokeless tobacco: Never   Substance Use Topics    Alcohol use: Yes     Alcohol/week: 2.0 standard drinks of alcohol           8/30/2023     2:12 PM   Alcohol Use   Prescreen: >3 drinks/day or >7  drinks/week? No     Reviewed orders with patient.  Reviewed health maintenance and updated orders accordingly - Yes      Breast Cancer Screenin/30/2023     2:12 PM   Breast CA Risk Assessment (FHS-7)   Do you have a family history of breast, colon, or ovarian cancer? No / Unknown           Pertinent mammograms are reviewed under the imaging tab.    History of abnormal Pap smear: NO - age 30-65 PAP every 5 years with negative HPV co-testing recommended     Reviewed and updated as needed this visit by clinical staff                  Reviewed and updated as needed this visit by Provider                     Review of Systems   Constitutional:  Negative for chills and fever.   HENT:  Negative for congestion, ear pain, hearing loss and sore throat.    Eyes:  Negative for pain and visual disturbance.   Respiratory:  Negative for cough and shortness of breath.    Cardiovascular:  Negative for chest pain, palpitations and peripheral edema.   Gastrointestinal:  Negative for abdominal pain, constipation, diarrhea, heartburn, hematochezia and nausea.   Breasts:  Negative for tenderness, breast mass and discharge.   Genitourinary:  Negative for dysuria, frequency, genital sores, hematuria, pelvic pain, urgency, vaginal bleeding and vaginal discharge.   Musculoskeletal:  Positive for arthralgias. Negative for joint swelling and myalgias.   Skin:  Negative for rash.   Neurological:  Positive for headaches. Negative for dizziness, weakness and paresthesias.   Psychiatric/Behavioral:  Negative for mood changes. The patient is nervous/anxious.           OBJECTIVE:   LMP  (LMP Unknown)   Physical Exam  GENERAL: healthy, alert and no distress  EYES: Eyes grossly normal to inspection, PERRL and conjunctivae and sclerae normal  HENT: ear canals and TM's normal, nose and mouth without ulcers or lesions  NECK: no adenopathy, no asymmetry, masses, or scars and thyroid normal to palpation  RESP: lungs clear to auscultation - no  rales, rhonchi or wheezes  BREAST: normal without masses, tenderness or nipple discharge and no palpable axillary masses or adenopathy  CV: regular rate and rhythm, normal S1 S2, no S3 or S4, no murmur, click or rub, no peripheral edema and peripheral pulses strong  ABDOMEN: soft, nontender, no hepatosplenomegaly, no masses and bowel sounds normal  MS: no gross musculoskeletal defects noted, no edema  SKIN: no suspicious lesions or rashes  NEURO: Normal strength and tone, mentation intact and speech normal  PSYCH: mentation appears normal, affect normal/bright        ASSESSMENT/PLAN:   (F41.9) Anxiety  (primary encounter diagnosis)  Comment: Patient with anxiety, takes Lexapro.  She feels that she has had increased anxiety and we discussed switching meds versus increasing dose.  She would like to increase dose first as she knows this medication previously had better control for her anxiety.  Plan: escitalopram (LEXAPRO) 20 MG tablet        Follow-up appointment scheduled for 10/12/2023.  We will reevaluate anxiety at that time.    (F43.9) Stress  Comment: Patient feels she has an increased level of stress which may be contributing to weight gain.  She would like to check cortisol level today, we discussed this may be fluctuating and not indicative of stress.  We discussed stress relieving measures.  Plan: Cortisol            (Z13.1) Screening for diabetes mellitus  Comment: We will screen for diabetes today.  No known history.  Plan: Basic metabolic panel  (Ca, Cl, CO2, Creat,         Gluc, K, Na, BUN)            (Z86.39) History of Graves' disease  Comment: Patient with history of Graves' disease.  She denies any symptoms today.  Last TSH was in 2021.  Will recheck today.  Plan: TSH with free T4 reflex            (Z11.59) Need for hepatitis C screening test  Comment: Amenable to one-time hepatitis C screening as recommended for all sexually active adults.  Plan: Hepatitis C Screen Reflex to HCV RNA Quant and          Genotype            (Z13.220) Lipid screening  Comment: Lipid screening today.  We discussed ASCVD 10-year risk.  Calculator using last lipid results gives patient 10-year risk of 0.3%.  Will reevaluate when results available and adjust plan of care as needed  Plan: Lipid Profile (Chol, Trig, HDL, LDL calc)                  COUNSELING:  Reviewed preventive health counseling, as reflected in patient instructions       Regular exercise       Healthy diet/nutrition       Consider Hep C screening for all patients one time for ages 18-79 years       HIV screeninx in teen years, 1x in adult years, and at intervals if high risk       (Gisselle)menopause management        She reports that she has never smoked. She has never used smokeless tobacco.          RUFINA Naqvi CNP  [facility]

## 2023-09-05 ENCOUNTER — ALLIED HEALTH/NURSE VISIT (OUTPATIENT)
Dept: FAMILY MEDICINE | Facility: CLINIC | Age: 43
End: 2023-09-05
Payer: COMMERCIAL

## 2023-09-05 ENCOUNTER — LAB (OUTPATIENT)
Dept: LAB | Facility: CLINIC | Age: 43
End: 2023-09-05
Payer: COMMERCIAL

## 2023-09-05 DIAGNOSIS — Z13.1 SCREENING FOR DIABETES MELLITUS: ICD-10-CM

## 2023-09-05 DIAGNOSIS — Z86.39 HISTORY OF GRAVES' DISEASE: ICD-10-CM

## 2023-09-05 DIAGNOSIS — Z13.220 LIPID SCREENING: ICD-10-CM

## 2023-09-05 DIAGNOSIS — Z11.59 NEED FOR HEPATITIS C SCREENING TEST: ICD-10-CM

## 2023-09-05 DIAGNOSIS — F43.9 STRESS: ICD-10-CM

## 2023-09-05 DIAGNOSIS — Z23 NEED FOR VACCINATION: Primary | ICD-10-CM

## 2023-09-05 LAB
ANION GAP SERPL CALCULATED.3IONS-SCNC: 9 MMOL/L (ref 7–15)
BUN SERPL-MCNC: 18.9 MG/DL (ref 6–20)
CALCIUM SERPL-MCNC: 9.2 MG/DL (ref 8.6–10)
CHLORIDE SERPL-SCNC: 104 MMOL/L (ref 98–107)
CHOLEST SERPL-MCNC: 213 MG/DL
CORTIS SERPL-MCNC: 4.9 UG/DL
CREAT SERPL-MCNC: 0.74 MG/DL (ref 0.51–0.95)
DEPRECATED HCO3 PLAS-SCNC: 25 MMOL/L (ref 22–29)
GFR SERPL CREATININE-BSD FRML MDRD: >90 ML/MIN/1.73M2
GLUCOSE SERPL-MCNC: 85 MG/DL (ref 70–99)
HCV AB SERPL QL IA: NONREACTIVE
HDLC SERPL-MCNC: 56 MG/DL
LDLC SERPL CALC-MCNC: 137 MG/DL
NONHDLC SERPL-MCNC: 157 MG/DL
POTASSIUM SERPL-SCNC: 4.3 MMOL/L (ref 3.4–5.3)
SODIUM SERPL-SCNC: 138 MMOL/L (ref 136–145)
TRIGL SERPL-MCNC: 101 MG/DL
TSH SERPL DL<=0.005 MIU/L-ACNC: 1.79 UIU/ML (ref 0.3–4.2)

## 2023-09-05 PROCEDURE — 36415 COLL VENOUS BLD VENIPUNCTURE: CPT

## 2023-09-05 PROCEDURE — 99207 PR NO CHARGE NURSE ONLY: CPT

## 2023-09-05 PROCEDURE — 86803 HEPATITIS C AB TEST: CPT

## 2023-09-05 PROCEDURE — 80061 LIPID PANEL: CPT

## 2023-09-05 PROCEDURE — 84443 ASSAY THYROID STIM HORMONE: CPT

## 2023-09-05 PROCEDURE — 80048 BASIC METABOLIC PNL TOTAL CA: CPT

## 2023-09-05 PROCEDURE — 82533 TOTAL CORTISOL: CPT

## 2023-10-12 ENCOUNTER — VIRTUAL VISIT (OUTPATIENT)
Dept: FAMILY MEDICINE | Facility: CLINIC | Age: 43
End: 2023-10-12
Payer: COMMERCIAL

## 2023-10-12 DIAGNOSIS — F41.9 ANXIETY: Primary | ICD-10-CM

## 2023-10-12 PROCEDURE — 99213 OFFICE O/P EST LOW 20 MIN: CPT | Mod: VID

## 2023-10-12 RX ORDER — ESCITALOPRAM OXALATE 10 MG/1
20 TABLET ORAL DAILY
Qty: 180 TABLET | Refills: 3 | Status: SHIPPED | OUTPATIENT
Start: 2023-10-12 | End: 2024-02-29

## 2023-10-12 NOTE — PROGRESS NOTES
"Brandy is a 43 year old who is being evaluated via a billable video visit.      How would you like to obtain your AVS? MyChart  If the video visit is dropped, the invitation should be resent by: Text to cell phone: [phone]  Will anyone else be joining your video visit? No        Assessment & Plan     Anxiety  Follow-up after dose increase of escitalopram for anxiety.  Patient reports she has had decreased irritability and jaw clenching at night leading to less pain when she wakes up.  She would like to continue at current dose.  Patient advised should she have an increase in symptoms or other concerns to follow-up as needed.  - escitalopram (LEXAPRO) 10 MG tablet; Take 2 tablets (20 mg) by mouth daily         dose increase of escitalopram for anxiety.       BMI:   Estimated body mass index is 30.11 kg/m  as calculated from the following:    Height as of 8/30/23: 1.562 m (5' 1.5\").    Weight as of 8/30/23: 73.5 kg (162 lb).           Brandy GranadoRUFINA [facility]    Subjective   Brandy is a 43 year old, presenting for the following health issues:  Recheck Medication (Medication follow up)        10/12/2023     9:09 AM   Additional Questions   Roomed by SHAE Cordon       Increasing dose has helped, decreased irritability and jaw clenched.     History of Present Illness       Reason for visit:  Medication follow up    She eats 2-3 servings of fruits and vegetables daily.She consumes 1 sweetened beverage(s) daily.She exercises with enough effort to increase her heart rate 9 or less minutes per day.  She exercises with enough effort to increase her heart rate 3 or less days per week.   She is taking medications regularly.               Review of Systems   Constitutional, HEENT, cardiovascular, pulmonary, gi and gu systems are negative, except as otherwise noted.      Objective           Vitals:  No vitals were obtained today due to virtual visit.    Physical Exam   GENERAL: " Healthy, alert and no distress  EYES: Eyes grossly normal to inspection.  No discharge or erythema, or obvious scleral/conjunctival abnormalities.  RESP: No audible wheeze, cough, or visible cyanosis.  No visible retractions or increased work of breathing.    SKIN: Visible skin clear. No significant rash, abnormal pigmentation or lesions.  NEURO: Cranial nerves grossly intact.  Mentation and speech appropriate for age.  PSYCH: Mentation appears normal, affect normal/bright, judgement and insight intact, normal speech and appearance well-groomed.                Video-Visit Details    Type of service:  Video Visit     Originating Location (pt. Location): Home    Distant Location (provider location):  On-site  Platform used for Video Visit: TIMPIK

## 2023-12-04 ENCOUNTER — TELEPHONE (OUTPATIENT)
Dept: NURSING | Facility: CLINIC | Age: 43
End: 2023-12-04
Payer: COMMERCIAL

## 2023-12-04 ENCOUNTER — MYC REFILL (OUTPATIENT)
Dept: FAMILY MEDICINE | Facility: CLINIC | Age: 43
End: 2023-12-04
Payer: COMMERCIAL

## 2023-12-04 DIAGNOSIS — F41.9 ANXIETY: ICD-10-CM

## 2023-12-04 RX ORDER — ESCITALOPRAM OXALATE 20 MG/1
20 TABLET ORAL DAILY
Qty: 30 TABLET | Refills: 1 | Status: CANCELLED | OUTPATIENT
Start: 2023-12-04

## 2023-12-04 RX ORDER — ESCITALOPRAM OXALATE 10 MG/1
20 TABLET ORAL DAILY
Qty: 180 TABLET | Refills: 3 | Status: CANCELLED | OUTPATIENT
Start: 2023-12-04

## 2023-12-04 RX ORDER — ESCITALOPRAM OXALATE 20 MG/1
20 TABLET ORAL DAILY
Qty: 30 TABLET | Refills: 0 | Status: SHIPPED | OUTPATIENT
Start: 2023-12-04 | End: 2024-01-02

## 2023-12-04 NOTE — TELEPHONE ENCOUNTER
S-(situation):   Patient requesting refill of Lexapro be sent to pharmacy local.    B-(background):   [facility] stating that no prescription refill on file.    A-(assessment):   Resent per protocol.    R-(recommendations):   Confirmed with patient. Taking 20mg tab 1 po daily.  Will resend RX locally.

## 2023-12-04 NOTE — TELEPHONE ENCOUNTER
Advised patient to contact pharmacy.Refills should be on file.  Virtual Visit: 10/12/2023 ASTRID Granado    RX: 10/12/23  Take 2 tabs daily  #180  R-3

## 2023-12-28 ENCOUNTER — E-VISIT (OUTPATIENT)
Dept: FAMILY MEDICINE | Facility: CLINIC | Age: 43
End: 2023-12-28
Payer: COMMERCIAL

## 2023-12-28 DIAGNOSIS — J06.9 VIRAL URI WITH COUGH: Primary | ICD-10-CM

## 2023-12-28 PROCEDURE — 99207 PR NON-BILLABLE SERV PER CHARTING: CPT

## 2024-01-02 DIAGNOSIS — F41.9 ANXIETY: ICD-10-CM

## 2024-01-02 RX ORDER — ESCITALOPRAM OXALATE 20 MG/1
20 TABLET ORAL DAILY
Qty: 90 TABLET | Refills: 1 | Status: SHIPPED | OUTPATIENT
Start: 2024-01-02 | End: 2024-07-11

## 2024-02-29 ENCOUNTER — LAB (OUTPATIENT)
Dept: LAB | Facility: CLINIC | Age: 44
End: 2024-02-29
Payer: COMMERCIAL

## 2024-02-29 ENCOUNTER — E-VISIT (OUTPATIENT)
Dept: FAMILY MEDICINE | Facility: CLINIC | Age: 44
End: 2024-02-29
Payer: COMMERCIAL

## 2024-02-29 DIAGNOSIS — J02.9 PHARYNGITIS, UNSPECIFIED ETIOLOGY: Primary | ICD-10-CM

## 2024-02-29 DIAGNOSIS — J02.9 PHARYNGITIS, UNSPECIFIED ETIOLOGY: ICD-10-CM

## 2024-02-29 LAB
DEPRECATED S PYO AG THROAT QL EIA: NEGATIVE
FLUAV AG SPEC QL IA: POSITIVE
FLUBV AG SPEC QL IA: NEGATIVE
GROUP A STREP BY PCR: NOT DETECTED
SARS-COV-2 RNA RESP QL NAA+PROBE: NEGATIVE

## 2024-02-29 PROCEDURE — 99421 OL DIG E/M SVC 5-10 MIN: CPT

## 2024-02-29 PROCEDURE — 87651 STREP A DNA AMP PROBE: CPT

## 2024-02-29 PROCEDURE — 87635 SARS-COV-2 COVID-19 AMP PRB: CPT

## 2024-02-29 PROCEDURE — 87804 INFLUENZA ASSAY W/OPTIC: CPT | Mod: QW

## 2024-02-29 RX ORDER — IBUPROFEN 200 MG
400 TABLET ORAL EVERY 6 HOURS PRN
Qty: 100 TABLET | Refills: 0 | Status: SHIPPED | OUTPATIENT
Start: 2024-02-29

## 2024-02-29 RX ORDER — ACETAMINOPHEN 325 MG/1
650 TABLET ORAL EVERY 6 HOURS PRN
Qty: 100 TABLET | Refills: 0 | Status: SHIPPED | OUTPATIENT
Start: 2024-02-29 | End: 2024-07-11

## 2024-02-29 NOTE — PATIENT INSTRUCTIONS
Dear Brandy,    After reviewing your responses, I would like you to come in for a swab to make sure we treat you correctly. This swab is to evaluate you for possible COVID and Strep Throat, and should be scheduled for today or tomorrow. Please use the Schedule Now button in [facility] to schedule your swab. Otherwise, click this link to schedule a lab only appointment.    Lab appointments are not available at most locations on the weekends. If no Lab Only appointment is available, you should be seen in any of our convenient Urgent Care Centers for an in person visit, which can be found on our website here.    You will receive instructions with your results in [facility] once they are available.     If your symptoms worsen, you develop difficulty breathing, difficulty with drinking enough to stay hydrated, difficulty swallowing your saliva or have fevers for more than 5 days, please contact your primary care provider for an appointment or visit an Urgent Care Center to be seen.      Thanks again for choosing us as your health care partner.   RUFINA Naqvi CNP  Sore Throat: Care Instructions  Overview     Infection by bacteria or a virus causes most sore throats. Cigarette smoke, dry air, air pollution, allergies, and yelling can also cause a sore throat. Sore throats can be painful and annoying. Fortunately, most sore throats go away on their own. If you have a bacterial infection, your doctor may prescribe antibiotics.  Follow-up care is a key part of your treatment and safety. Be sure to make and go to all appointments, and call your doctor if you are having problems. It's also a good idea to know your test results and keep a list of the medicines you take.  How can you care for yourself at home?  If your doctor prescribed antibiotics, take them as directed. Do not stop taking them just because you feel better. You need to take the full course of antibiotics.  Gargle with warm salt water several times a day to  "help reduce swelling and relieve pain. Mix 1/2 teaspoon of salt in 1 cup of warm water.  Take an over-the-counter pain medicine, such as acetaminophen (Tylenol), ibuprofen (Advil, Motrin), or naproxen (Aleve). Read and follow all instructions on the label.  Be careful when taking over-the-counter cold or flu medicines and Tylenol at the same time. Many of these medicines have acetaminophen, which is Tylenol. Read the labels to make sure that you are not taking more than the recommended dose. Too much acetaminophen (Tylenol) can be harmful.  Drink plenty of fluids. Fluids may help soothe an irritated throat. Hot fluids, such as tea or soup, may help decrease throat pain.  Use over-the-counter throat lozenges to soothe pain. Regular cough drops or hard candy may also help. These should not be given to young children because of the risk of choking.  Do not smoke or allow others to smoke around you. If you need help quitting, talk to your doctor about stop-smoking programs and medicines. These can increase your chances of quitting for good.  Use a vaporizer or humidifier to add moisture to your bedroom. Follow the directions for cleaning the machine.  When should you call for help?   Call your doctor now or seek immediate medical care if:    You have trouble breathing.     Your sore throat gets much worse on one side.     You have new or worse trouble swallowing.     You have a new or higher fever.   Watch closely for changes in your health, and be sure to contact your doctor if you do not get better as expected.  Where can you learn more?  Go to https://www.PAK.net/patiented  Enter U420 in the search box to learn more about \"Sore Throat: Care Instructions.\"  Current as of: February 28, 2023               Content Version: 13.8    7502-9503 [employer].   Care instructions adapted under license by your healthcare professional. If you have questions about a medical condition or this instruction, always " ask your healthcare professional. [employer] disclaims any warranty or liability for your use of this information.      Brandy,    Thank you for choosing us for your care. I have placed an order for a lab test(s). View your full visit summary for details by clicking on the link below. You can schedule a lab only appointment right here in [facility], or by calling [phone].    You will receive your lab results and next steps via [facility] when the lab results return.    Sincerely,  RUFINA Naqvi CNP

## 2024-06-06 ENCOUNTER — OFFICE VISIT (OUTPATIENT)
Dept: FAMILY MEDICINE | Facility: CLINIC | Age: 44
End: 2024-06-06
Payer: COMMERCIAL

## 2024-06-06 ENCOUNTER — TELEPHONE (OUTPATIENT)
Dept: FAMILY MEDICINE | Facility: CLINIC | Age: 44
End: 2024-06-06

## 2024-06-06 VITALS
BODY MASS INDEX: 29.79 KG/M2 | HEIGHT: 62 IN | OXYGEN SATURATION: 99 % | HEART RATE: 74 BPM | SYSTOLIC BLOOD PRESSURE: 105 MMHG | RESPIRATION RATE: 16 BRPM | TEMPERATURE: 98.4 F | WEIGHT: 161.9 LBS | DIASTOLIC BLOOD PRESSURE: 72 MMHG

## 2024-06-06 DIAGNOSIS — E66.09 CLASS 1 OBESITY DUE TO EXCESS CALORIES WITHOUT SERIOUS COMORBIDITY WITH BODY MASS INDEX (BMI) OF 30.0 TO 30.9 IN ADULT: Primary | ICD-10-CM

## 2024-06-06 DIAGNOSIS — E66.811 CLASS 1 OBESITY DUE TO EXCESS CALORIES WITHOUT SERIOUS COMORBIDITY WITH BODY MASS INDEX (BMI) OF 30.0 TO 30.9 IN ADULT: Primary | ICD-10-CM

## 2024-06-06 PROCEDURE — 99213 OFFICE O/P EST LOW 20 MIN: CPT

## 2024-06-06 RX ORDER — PHENTERMINE AND TOPIRAMATE 7.5; 46 MG/1; MG/1
1 CAPSULE, EXTENDED RELEASE ORAL DAILY
Qty: 30 CAPSULE | Refills: 0 | Status: SHIPPED | OUTPATIENT
Start: 2024-06-06 | End: 2024-07-11

## 2024-06-06 RX ORDER — ESCITALOPRAM OXALATE 10 MG/1
10 TABLET ORAL 2 TIMES DAILY
COMMUNITY
Start: 2024-03-09 | End: 2024-07-11

## 2024-06-06 NOTE — TELEPHONE ENCOUNTER
Contacted patient - she said she was instructed this morning to call back if insurance did not cover this prescription and Brandy would send in an alternative.    Routing to provider.

## 2024-06-06 NOTE — PROGRESS NOTES
"  Assessment & Plan     Class 1 obesity due to excess calories without serious comorbidity with body mass index (BMI) of 30.0 to 30.9 in adult  Patient in clinic to further discuss weight loss medications.  Health plan does not cover GLP-1 medications.  We discussed Contrave, Qsymia, orlistat.  Patient is interested in trying Qsymia.  We discussed side effects of this medication.  We also discussed prescribing the components of this medication separately if brand-name, not covered by insurance.  Patient verbalizes understanding.  - Phentermine-Topiramate (QSYMIA) 7.5-46 MG CP24; Take 1 capsule by mouth daily          BMI  Estimated body mass index is 30.1 kg/m  as calculated from the following:    Height as of this encounter: 1.562 m (5' 1.5\").    Weight as of this encounter: 73.4 kg (161 lb 14.4 oz).             Randolph Nava is a 44 year old, presenting for the following health issues:  RECHECK ( Medication follow up )        6/6/2024     9:11 AM   Additional Questions   Roomed by SHAE Cordon     History of Present Illness       Reason for visit:  Weight loss    She eats 2-3 servings of fruits and vegetables daily.She consumes 0 sweetened beverage(s) daily.She exercises with enough effort to increase her heart rate 10 to 19 minutes per day.  She exercises with enough effort to increase her heart rate 5 days per week.   She is taking medications regularly.                     Objective    /72 (BP Location: Right arm, Patient Position: Sitting, Cuff Size: Adult Large)   Pulse 74   Temp 98.4  F (36.9  C) (Oral)   Resp 16   Ht 1.562 m (5' 1.5\")   Wt 73.4 kg (161 lb 14.4 oz)   SpO2 99%   BMI 30.10 kg/m    Body mass index is 30.1 kg/m .  Physical Exam   GENERAL: alert and no distress  NECK: no adenopathy, no asymmetry, masses, or scars  RESP: lungs clear to auscultation - no rales, rhonchi or wheezes  CV: regular rate and rhythm, normal S1 S2, no S3 or S4, no murmur, click or rub, no peripheral " edema  ABDOMEN: soft, nontender, no hepatosplenomegaly, no masses and bowel sounds normal  MS: no gross musculoskeletal defects noted, no edema            Signed Electronically by: RUFINA Naqvi CNP

## 2024-06-06 NOTE — TELEPHONE ENCOUNTER
Reason for Call:  Other prescription    Detailed comments: Prescription that was sent in today (Qsymia) is not coverred by pts insurance. Requesting alternative. Please reach out to further assist.     Phone Number Patient can be reached at: Cell number on file:    Telephone Information:   Mobile [phone]       Best Time: anytime    Can we leave a detailed message on this number? YES    Call taken on 6/6/2024 at 3:38 PM by Brooke Salinas

## 2024-06-07 RX ORDER — TOPIRAMATE 25 MG/1
25 TABLET, FILM COATED ORAL 2 TIMES DAILY
Qty: 30 TABLET | Refills: 0 | Status: SHIPPED | OUTPATIENT
Start: 2024-06-07 | End: 2024-06-19

## 2024-06-07 RX ORDER — PHENTERMINE HYDROCHLORIDE 15 MG/1
15 CAPSULE ORAL EVERY MORNING
Qty: 30 CAPSULE | Refills: 0 | Status: SHIPPED | OUTPATIENT
Start: 2024-06-07 | End: 2024-06-26

## 2024-06-19 DIAGNOSIS — E66.811 CLASS 1 OBESITY DUE TO EXCESS CALORIES WITHOUT SERIOUS COMORBIDITY WITH BODY MASS INDEX (BMI) OF 30.0 TO 30.9 IN ADULT: ICD-10-CM

## 2024-06-19 DIAGNOSIS — E66.09 CLASS 1 OBESITY DUE TO EXCESS CALORIES WITHOUT SERIOUS COMORBIDITY WITH BODY MASS INDEX (BMI) OF 30.0 TO 30.9 IN ADULT: ICD-10-CM

## 2024-06-19 RX ORDER — TOPIRAMATE 25 MG/1
25 TABLET, FILM COATED ORAL 2 TIMES DAILY
Qty: 30 TABLET | Refills: 0 | Status: SHIPPED | OUTPATIENT
Start: 2024-06-19 | End: 2024-06-26

## 2024-06-26 ENCOUNTER — MYC REFILL (OUTPATIENT)
Dept: FAMILY MEDICINE | Facility: CLINIC | Age: 44
End: 2024-06-26
Payer: COMMERCIAL

## 2024-06-26 DIAGNOSIS — E66.811 CLASS 1 OBESITY DUE TO EXCESS CALORIES WITHOUT SERIOUS COMORBIDITY WITH BODY MASS INDEX (BMI) OF 30.0 TO 30.9 IN ADULT: ICD-10-CM

## 2024-06-26 DIAGNOSIS — E66.09 CLASS 1 OBESITY DUE TO EXCESS CALORIES WITHOUT SERIOUS COMORBIDITY WITH BODY MASS INDEX (BMI) OF 30.0 TO 30.9 IN ADULT: ICD-10-CM

## 2024-06-26 RX ORDER — PHENTERMINE HYDROCHLORIDE 15 MG/1
15 CAPSULE ORAL EVERY MORNING
Qty: 30 CAPSULE | Refills: 0 | Status: SHIPPED | OUTPATIENT
Start: 2024-06-26 | End: 2024-07-11

## 2024-06-26 RX ORDER — TOPIRAMATE 25 MG/1
25 TABLET, FILM COATED ORAL 2 TIMES DAILY
Qty: 60 TABLET | Refills: 0 | Status: SHIPPED | OUTPATIENT
Start: 2024-06-26 | End: 2024-07-11

## 2024-06-26 NOTE — TELEPHONE ENCOUNTER
Last office visit: 6/6/2024   Last RX: 6/7/24  #30    Future Appointments 6/26/2024 - 12/23/2024        Date Visit Type Length Department Provider     7/11/2024  9:30 AM OFFICE VISIT 30 min [facility] FP/IM/PEDS Brandy Granado APRN CNP    Location Instructions:     [facility], one block north of [facility]. The clinic shares a building with the [facility] [employer]; use the clinic s parking lot and entrance on the building s south side.                      Requested Prescriptions   Pending Prescriptions Disp Refills    phentermine 15 MG capsule 30 capsule 0     Sig: Take 1 capsule (15 mg) by mouth every morning       There is no refill protocol information for this order

## 2024-06-26 NOTE — TELEPHONE ENCOUNTER
Routing refill request to provider for review/approval because:  Drug [facility] refill protocol       Last Written Prescription Date:  6/19/24  Last Fill Quantity: 30,  # refills: 0   Last office visit: 6/6/2024 ; last virtual visit: 10/12/2023 with prescribing provider   Future Office Visit:   Next 5 appointments (look out 90 days)      Jul 11, 2024 9:30 AM  (Arrive by 9:10 AM)  Provider Visit with RUFINA Naqvi CNP  [facility] ([facility] ) [address] [place] [id number] [phone]

## 2024-07-11 ENCOUNTER — OFFICE VISIT (OUTPATIENT)
Dept: FAMILY MEDICINE | Facility: CLINIC | Age: 44
End: 2024-07-11
Payer: COMMERCIAL

## 2024-07-11 VITALS
HEIGHT: 62 IN | OXYGEN SATURATION: 98 % | SYSTOLIC BLOOD PRESSURE: 116 MMHG | DIASTOLIC BLOOD PRESSURE: 76 MMHG | BODY MASS INDEX: 29.37 KG/M2 | TEMPERATURE: 98.3 F | HEART RATE: 73 BPM | WEIGHT: 159.6 LBS | RESPIRATION RATE: 16 BRPM

## 2024-07-11 DIAGNOSIS — F41.9 ANXIETY: ICD-10-CM

## 2024-07-11 DIAGNOSIS — E66.811 CLASS 1 OBESITY DUE TO EXCESS CALORIES WITHOUT SERIOUS COMORBIDITY WITH BODY MASS INDEX (BMI) OF 30.0 TO 30.9 IN ADULT: ICD-10-CM

## 2024-07-11 DIAGNOSIS — E66.09 CLASS 1 OBESITY DUE TO EXCESS CALORIES WITHOUT SERIOUS COMORBIDITY WITH BODY MASS INDEX (BMI) OF 30.0 TO 30.9 IN ADULT: ICD-10-CM

## 2024-07-11 PROCEDURE — 99213 OFFICE O/P EST LOW 20 MIN: CPT

## 2024-07-11 RX ORDER — ESCITALOPRAM OXALATE 10 MG/1
10 TABLET ORAL 2 TIMES DAILY
Qty: 180 TABLET | Refills: 1 | Status: SHIPPED | OUTPATIENT
Start: 2024-07-11

## 2024-07-11 RX ORDER — PHENTERMINE HYDROCHLORIDE 15 MG/1
30 CAPSULE ORAL EVERY MORNING
Qty: 60 CAPSULE | Refills: 1 | Status: SHIPPED | OUTPATIENT
Start: 2024-07-11 | End: 2024-09-25

## 2024-07-11 RX ORDER — TOPIRAMATE 25 MG/1
25 TABLET, FILM COATED ORAL 2 TIMES DAILY
Qty: 60 TABLET | Refills: 1 | Status: SHIPPED | OUTPATIENT
Start: 2024-07-11 | End: 2024-09-12

## 2024-07-11 NOTE — PROGRESS NOTES
"  Assessment & Plan     Class 1 obesity due to excess calories without serious comorbidity with body mass index (BMI) of 30.0 to 30.9 in adult  1 month follow-up after starting phentermine and topiramate.  Patient reports had side effects of sweating and feeling jittery first few weeks of medication which have since resolved.  Patient reports approximately 4 pound weight loss.  Patient has not noted any side effects from the topiramate.  Discussed continuing at same dose of phentermine versus increasing.  Shared decision making to increase dose with patient and for experiencing side effects may drop down to original dose.  Will keep topiramate the same.  Follow-up in 2 months for check-in or sooner as needed.  - phentermine 15 MG capsule; Take 2 capsules (30 mg) by mouth every morning  - topiramate (TOPAMAX) 25 MG tablet; Take 1 tablet (25 mg) by mouth 2 times daily    Anxiety  Stable, escitalopram refilled.  - escitalopram (LEXAPRO) 10 MG tablet; Take 1 tablet (10 mg) by mouth 2 times daily          BMI  Estimated body mass index is 29.67 kg/m  as calculated from the following:    Height as of this encounter: 1.562 m (5' 1.5\").    Weight as of this encounter: 72.4 kg (159 lb 9.6 oz).             Randolph Nava is a 44 year old, presenting for the following health issues:  RECHECK (Wt loss medication follow up )        7/11/2024     9:21 AM   Additional Questions   Roomed by SHAE Cordon     Has been taking phentermine for one month. Has had approx 4 lbs of weight loss in past month. Does not decrease in her appetite.     History of Present Illness       Reason for visit:  Follow up for meds    She eats 2-3 servings of fruits and vegetables daily.She consumes 0 sweetened beverage(s) daily.She exercises with enough effort to increase her heart rate 9 or less minutes per day.  She exercises with enough effort to increase her heart rate 3 or less days per week.   She is taking medications regularly.   " "                  Objective    /76 (BP Location: Right arm, Patient Position: Sitting, Cuff Size: Adult Regular)   Pulse 73   Temp 98.3  F (36.8  C) (Oral)   Resp 16   Ht 1.562 m (5' 1.5\")   Wt 72.4 kg (159 lb 9.6 oz)   SpO2 98%   BMI 29.67 kg/m    Body mass index is 29.67 kg/m .  Physical Exam  HENT:      Head: Normocephalic.      Mouth/Throat:      Mouth: Mucous membranes are moist.   Eyes:      Extraocular Movements: Extraocular movements intact.      Conjunctiva/sclera: Conjunctivae normal.   Cardiovascular:      Rate and Rhythm: Normal rate.   Pulmonary:      Effort: Pulmonary effort is normal.   Skin:     General: Skin is warm.      Capillary Refill: Capillary refill takes less than 2 seconds.   Neurological:      Mental Status: She is alert and oriented to person, place, and time.                    Signed Electronically by: RUFINA Naqvi CNP    "

## 2024-09-12 DIAGNOSIS — E66.09 CLASS 1 OBESITY DUE TO EXCESS CALORIES WITHOUT SERIOUS COMORBIDITY WITH BODY MASS INDEX (BMI) OF 30.0 TO 30.9 IN ADULT: ICD-10-CM

## 2024-09-12 DIAGNOSIS — E66.811 CLASS 1 OBESITY DUE TO EXCESS CALORIES WITHOUT SERIOUS COMORBIDITY WITH BODY MASS INDEX (BMI) OF 30.0 TO 30.9 IN ADULT: ICD-10-CM

## 2024-09-12 RX ORDER — TOPIRAMATE 25 MG/1
25 TABLET, FILM COATED ORAL 2 TIMES DAILY
Qty: 60 TABLET | Refills: 1 | Status: SHIPPED | OUTPATIENT
Start: 2024-09-12

## 2024-09-25 ENCOUNTER — MYC MEDICAL ADVICE (OUTPATIENT)
Dept: FAMILY MEDICINE | Facility: CLINIC | Age: 44
End: 2024-09-25

## 2024-09-25 DIAGNOSIS — E66.09 CLASS 1 OBESITY DUE TO EXCESS CALORIES WITHOUT SERIOUS COMORBIDITY WITH BODY MASS INDEX (BMI) OF 30.0 TO 30.9 IN ADULT: ICD-10-CM

## 2024-09-25 DIAGNOSIS — E66.811 CLASS 1 OBESITY DUE TO EXCESS CALORIES WITHOUT SERIOUS COMORBIDITY WITH BODY MASS INDEX (BMI) OF 30.0 TO 30.9 IN ADULT: ICD-10-CM

## 2024-09-25 RX ORDER — PHENTERMINE HYDROCHLORIDE 15 MG/1
30 CAPSULE ORAL EVERY MORNING
Qty: 60 CAPSULE | Refills: 1 | Status: SHIPPED | OUTPATIENT
Start: 2024-09-25 | End: 2024-09-27

## 2024-09-26 RX ORDER — PHENTERMINE HYDROCHLORIDE 30 MG/1
30 CAPSULE ORAL EVERY MORNING
Qty: 30 CAPSULE | Refills: 0 | Status: SHIPPED | OUTPATIENT
Start: 2024-09-26 | End: 2024-09-27

## 2024-09-26 NOTE — TELEPHONE ENCOUNTER
General Call    Contacts       Contact Date/Time Type Contact Phone/Fax    09/26/2024 10:26 AM CDT Phone (Incoming) Brandy Daniel (Self) [phone] (M)          Reason for Call:  Pt called Liudmila, she is asking for a new prescription of phentermine 15 MG capsule - her insurance wants 30mg tablets not 2 15mg tablets. Can this prescription be updated and re-sent?  They won't cover the 15mg 2 times daily.

## 2024-09-27 ENCOUNTER — OFFICE VISIT (OUTPATIENT)
Dept: FAMILY MEDICINE | Facility: CLINIC | Age: 44
End: 2024-09-27
Payer: COMMERCIAL

## 2024-09-27 VITALS
DIASTOLIC BLOOD PRESSURE: 68 MMHG | TEMPERATURE: 98.5 F | HEIGHT: 62 IN | OXYGEN SATURATION: 99 % | HEART RATE: 70 BPM | WEIGHT: 154 LBS | SYSTOLIC BLOOD PRESSURE: 122 MMHG | BODY MASS INDEX: 28.34 KG/M2 | RESPIRATION RATE: 16 BRPM

## 2024-09-27 DIAGNOSIS — N95.1 VASOMOTOR SYMPTOMS DUE TO MENOPAUSE: ICD-10-CM

## 2024-09-27 DIAGNOSIS — Z86.39 HISTORY OF GRAVES' DISEASE: ICD-10-CM

## 2024-09-27 DIAGNOSIS — E66.811 CLASS 1 OBESITY DUE TO EXCESS CALORIES WITHOUT SERIOUS COMORBIDITY WITH BODY MASS INDEX (BMI) OF 30.0 TO 30.9 IN ADULT: ICD-10-CM

## 2024-09-27 DIAGNOSIS — Z00.00 ENCOUNTER FOR PREVENTATIVE ADULT HEALTH CARE EXAMINATION: Primary | ICD-10-CM

## 2024-09-27 DIAGNOSIS — E66.09 CLASS 1 OBESITY DUE TO EXCESS CALORIES WITHOUT SERIOUS COMORBIDITY WITH BODY MASS INDEX (BMI) OF 30.0 TO 30.9 IN ADULT: ICD-10-CM

## 2024-09-27 LAB
T3FREE SERPL-MCNC: 2.6 PG/ML (ref 2–4.4)
TSH SERPL DL<=0.005 MIU/L-ACNC: 1.49 UIU/ML (ref 0.3–4.2)

## 2024-09-27 PROCEDURE — 84443 ASSAY THYROID STIM HORMONE: CPT

## 2024-09-27 PROCEDURE — 84481 FREE ASSAY (FT-3): CPT

## 2024-09-27 PROCEDURE — 36415 COLL VENOUS BLD VENIPUNCTURE: CPT

## 2024-09-27 PROCEDURE — 99396 PREV VISIT EST AGE 40-64: CPT

## 2024-09-27 RX ORDER — PHENTERMINE HYDROCHLORIDE 30 MG/1
30 CAPSULE ORAL EVERY MORNING
Qty: 30 CAPSULE | Refills: 1 | Status: SHIPPED | OUTPATIENT
Start: 2024-09-27

## 2024-09-27 RX ORDER — TOPIRAMATE 50 MG/1
50 TABLET, FILM COATED ORAL 2 TIMES DAILY
Qty: 30 TABLET | Refills: 1 | Status: SHIPPED | OUTPATIENT
Start: 2024-09-27

## 2024-09-27 NOTE — PROGRESS NOTES
"Preventive Care Visit  [facility]  Brandy GranadoRUFINA CNP, Family Medicine  Sep 27, 2024      Assessment & Plan     Encounter for preventative adult health care examination  Without abnormal findings.  Patient only concern is with vasomotor symptoms related to menopause, see below.    Class 1 obesity due to excess calories without serious comorbidity with body mass index (BMI) of 30.0 to 30.9 in adult  Patient has been taking phentermine and topiramate 25 mg for approximately 2 months.  She has had approximately 6 pound weight loss.  She is experiencing mild dry mouth, otherwise no side effects.  She is interested in increasing topiramate dose from 25 to 50 mg and this is done today.  Patient to notify clinic if experiencing side effects.  - phentermine 30 MG capsule; Take 1 capsule (30 mg) by mouth every morning.  - topiramate (TOPAMAX) 50 MG tablet; Take 1 tablet (50 mg) by mouth 2 times daily.    History of Graves' disease  History of Graves' disease, previously saw endocrinology,.  We will recheck labs today to make sure this  .  - TSH with free T4 reflex; Future  - T3, Free; Future  - TSH with free T4 reflex  - T3, Free    Vasomotor symptoms due to menopause  Patient is taking Lexapro for vasomotor symptoms associated with menopause.  We discussed hormone replacement therapy, patient does not have any family or personal history of breast cancer.  Patient has Mirena IUD.  Patient would be appropriate candidate referral placed.  - Ob/Gyn  Referral; Future            BMI  Estimated body mass index is 28.63 kg/m  as calculated from the following:    Height as of this encounter: 1.562 m (5' 1.5\").    Weight as of this encounter: 69.9 kg (154 lb).       Counseling  Appropriate preventive services were addressed with this patient via screening, questionnaire, or discussion as appropriate for fall prevention, nutrition, physical activity, Tobacco-use cessation, social engagement, " weight loss and cognition.  Checklist reviewing preventive services available has been given to the patient.  Reviewed patient's diet, addressing concerns and/or questions.   She is at risk for lack of exercise and has been provided with information to increase physical activity for the benefit of her well-being.           Randolph Nava is a 44 year old, presenting for the following:  Physical        9/27/2024     9:17 AM   Additional Questions   Roomed by thien   Accompanied by n/a        Health Care Directive  Patient does not have a Health Care Directive or Living Will: Discussed advance care planning with patient; however, patient declined at this time.    HPI  Is taking Lexapro for vasomotor menopause symptoms             9/27/2024   General Health   How would you rate your overall physical health? (!) FAIR   Feel stress (tense, anxious, or unable to sleep) Rather much      (!) STRESS CONCERN      9/27/2024   Nutrition   Three or more servings of calcium each day? (!) NO   Diet: Regular (no restrictions)   How many servings of fruit and vegetables per day? (!) 2-3   How many sweetened beverages each day? 0-1            9/27/2024   Exercise   Days per week of moderate/strenous exercise 1 day      (!) EXERCISE CONCERN      9/27/2024   Social Factors   Frequency of gathering with friends or relatives Once a week   Worry food won't last until get money to buy more No   Food not last or not have enough money for food? No   Do you have housing? (Housing is defined as stable permanent housing and does not include staying ouside in a car, in a tent, in an abandoned building, in an overnight shelter, or couch-surfing.) Yes   Are you worried about losing your housing? No   Lack of transportation? No   Unable to get utilities (heat,electricity)? No            9/27/2024   Dental   Dentist two times every year? Yes                 Today's PHQ-2 Score:       6/6/2024     8:49 AM   PHQ-2 ( 1999 Pfizer)   Q1: Little  "interest or pleasure in doing things 0   Q2: Feeling down, depressed or hopeless 0   PHQ-2 Score 0   Q1: Little interest or pleasure in doing things Not at all   Q2: Feeling down, depressed or hopeless Not at all   PHQ-2 Score 0         9/27/2024   Substance Use   Alcohol more than 3/day or more than 7/wk No   Do you use any other substances recreationally? No        Social History     Tobacco Use    Smoking status: Never     Passive exposure: Never    Smokeless tobacco: Never   Vaping Use    Vaping status: Never Used   Substance Use Topics    Alcohol use: Yes     Alcohol/week: 2.0 standard drinks of alcohol    Drug use: No           2/27/2023   LAST FHS-7 RESULTS   1st degree relative breast or ovarian cancer No   Any relative bilateral breast cancer No   Any male have breast cancer No   Any ONE woman have BOTH breast AND ovarian cancer No   Any woman with breast cancer before 50yrs No   2 or more relatives with breast AND/OR ovarian cancer No   2 or more relatives with breast AND/OR bowel cancer No                   9/27/2024   STI Screening   New sexual partner(s) since last STI/HIV test? No        History of abnormal Pap smear: No - age 30-64 HPV with reflex Pap every 5 years recommended       ASCVD Risk   The 10-year ASCVD risk score (Angeline DK, et al., 2019) is: 0.7%    Values used to calculate the score:      Age: 44 years      Sex: Female      Is Non- : No      Diabetic: No      Tobacco smoker: No      Systolic Blood Pressure: 122 mmHg      Is BP treated: No      HDL Cholesterol: 56 mg/dL      Total Cholesterol: 213 mg/dL        9/27/2024   Contraception/Family Planning   Questions about contraception or family planning No           Reviewed and updated as needed this visit by Provider                             Objective    Exam  /68 (BP Location: Right arm, Patient Position: Sitting)   Pulse 70   Temp 98.5  F (36.9  C) (Tympanic)   Resp 16   Ht 1.562 m (5' 1.5\")  " " Wt 69.9 kg (154 lb)   LMP  (LMP Unknown)   SpO2 99%   Breastfeeding No   BMI 28.63 kg/m     Estimated body mass index is 28.63 kg/m  as calculated from the following:    Height as of this encounter: 1.562 m (5' 1.5\").    Weight as of this encounter: 69.9 kg (154 lb).    Physical Exam  Neck:      Thyroid: No thyroid mass or thyromegaly.       GENERAL: alert and no distress  NECK: no adenopathy, no asymmetry, masses, or scars  RESP: lungs clear to auscultation - no rales, rhonchi or wheezes  CV: regular rate and rhythm, normal S1 S2, no S3 or S4, no murmur, click or rub, no peripheral edema  ABDOMEN: soft, nontender, no hepatosplenomegaly, no masses and bowel sounds normal  MS: no gross musculoskeletal defects noted, no edema        Signed Electronically by: RUFINA Naqvi CNP    "

## 2024-10-21 DIAGNOSIS — E66.09 CLASS 1 OBESITY DUE TO EXCESS CALORIES WITHOUT SERIOUS COMORBIDITY WITH BODY MASS INDEX (BMI) OF 30.0 TO 30.9 IN ADULT: ICD-10-CM

## 2024-10-21 DIAGNOSIS — E66.811 CLASS 1 OBESITY DUE TO EXCESS CALORIES WITHOUT SERIOUS COMORBIDITY WITH BODY MASS INDEX (BMI) OF 30.0 TO 30.9 IN ADULT: ICD-10-CM

## 2024-10-21 RX ORDER — TOPIRAMATE 50 MG/1
50 TABLET, FILM COATED ORAL 2 TIMES DAILY
Qty: 30 TABLET | Refills: 1 | Status: SHIPPED | OUTPATIENT
Start: 2024-10-21

## 2024-11-26 ENCOUNTER — TELEPHONE (OUTPATIENT)
Dept: FAMILY MEDICINE | Facility: CLINIC | Age: 44
End: 2024-11-26
Payer: COMMERCIAL

## 2024-11-26 DIAGNOSIS — E66.811 CLASS 1 OBESITY DUE TO EXCESS CALORIES WITHOUT SERIOUS COMORBIDITY WITH BODY MASS INDEX (BMI) OF 30.0 TO 30.9 IN ADULT: ICD-10-CM

## 2024-11-26 DIAGNOSIS — E66.09 CLASS 1 OBESITY DUE TO EXCESS CALORIES WITHOUT SERIOUS COMORBIDITY WITH BODY MASS INDEX (BMI) OF 30.0 TO 30.9 IN ADULT: ICD-10-CM

## 2024-11-26 RX ORDER — PHENTERMINE HYDROCHLORIDE 30 MG/1
30 CAPSULE ORAL EVERY MORNING
Qty: 30 CAPSULE | OUTPATIENT
Start: 2024-11-26

## 2024-11-26 NOTE — TELEPHONE ENCOUNTER
Pt called back a third time to check on status when she was told by Pharmacy that refill was denied. Explained it was a duplicate request and that we received it this am, and that it could take 1-3 business days.

## 2024-11-26 NOTE — TELEPHONE ENCOUNTER
11/26/2024    Pt called to check on the status of her medication refill. TC advised that we received the request today and the turn around time is 1-3 business days.    Dinah Sandoval

## 2024-11-26 NOTE — TELEPHONE ENCOUNTER
This refill request is a duplicate request, previously received or sent.   Sent denial notification to pharmacy.  JANELLE Rodriguez  [facility]

## 2024-11-30 ENCOUNTER — MYC REFILL (OUTPATIENT)
Dept: FAMILY MEDICINE | Facility: CLINIC | Age: 44
End: 2024-11-30
Payer: COMMERCIAL

## 2024-11-30 DIAGNOSIS — E66.09 CLASS 1 OBESITY DUE TO EXCESS CALORIES WITHOUT SERIOUS COMORBIDITY WITH BODY MASS INDEX (BMI) OF 30.0 TO 30.9 IN ADULT: ICD-10-CM

## 2024-11-30 DIAGNOSIS — E66.811 CLASS 1 OBESITY DUE TO EXCESS CALORIES WITHOUT SERIOUS COMORBIDITY WITH BODY MASS INDEX (BMI) OF 30.0 TO 30.9 IN ADULT: ICD-10-CM

## 2024-12-02 RX ORDER — PHENTERMINE HYDROCHLORIDE 30 MG/1
30 CAPSULE ORAL EVERY MORNING
Qty: 30 CAPSULE | Refills: 1 | Status: SHIPPED | OUTPATIENT
Start: 2024-12-02

## 2024-12-02 NOTE — TELEPHONE ENCOUNTER
Refused 6 days ago (11/26/2024):     Last office visit: 9/27/2024     Future Appointments 12/2/2024 - 5/31/2025        Date Visit Type Length Department Provider     12/18/2024  3:20 PM [facility] GYN RETURN 20 min [facility] Beth Dwyer APRN CNM    Location Instructions:     Parking lots and ramp are offered at the [facility]. service is also available at the main entrance to the [facility]. To check in for your appointment, enter through the main entrance of the [facility]. There will be a Welcome desk on the right side of the main lobby. Behind the Welcome desk is a bank of elevators. Take the elevators to the second floor. Turn left out of the elevator down the [address] to [address] and proceed to the .                     Requested Prescriptions   Pending Prescriptions Disp Refills    phentermine 30 MG capsule 30 capsule 1     Sig: Take 1 capsule (30 mg) by mouth every morning.       There is no refill protocol information for this order

## 2025-01-22 ENCOUNTER — MYC REFILL (OUTPATIENT)
Dept: FAMILY MEDICINE | Facility: CLINIC | Age: 45
End: 2025-01-22
Payer: COMMERCIAL

## 2025-01-22 DIAGNOSIS — F41.9 ANXIETY: ICD-10-CM

## 2025-01-22 RX ORDER — ESCITALOPRAM OXALATE 10 MG/1
10 TABLET ORAL 2 TIMES DAILY
Qty: 180 TABLET | Refills: 1 | Status: SHIPPED | OUTPATIENT
Start: 2025-01-22

## 2025-02-04 DIAGNOSIS — E66.811 CLASS 1 OBESITY DUE TO EXCESS CALORIES WITHOUT SERIOUS COMORBIDITY WITH BODY MASS INDEX (BMI) OF 30.0 TO 30.9 IN ADULT: ICD-10-CM

## 2025-02-04 DIAGNOSIS — E66.09 CLASS 1 OBESITY DUE TO EXCESS CALORIES WITHOUT SERIOUS COMORBIDITY WITH BODY MASS INDEX (BMI) OF 30.0 TO 30.9 IN ADULT: ICD-10-CM

## 2025-02-04 RX ORDER — PHENTERMINE HYDROCHLORIDE 30 MG/1
30 CAPSULE ORAL EVERY MORNING
Qty: 30 CAPSULE | Refills: 0 | Status: SHIPPED | OUTPATIENT
Start: 2025-02-04

## 2025-02-09 ENCOUNTER — OFFICE VISIT (OUTPATIENT)
Dept: URGENT CARE | Facility: URGENT CARE | Age: 45
End: 2025-02-09
Payer: COMMERCIAL

## 2025-02-09 VITALS
RESPIRATION RATE: 14 BRPM | HEART RATE: 71 BPM | TEMPERATURE: 98.7 F | BODY MASS INDEX: 26.02 KG/M2 | OXYGEN SATURATION: 100 % | WEIGHT: 140 LBS | SYSTOLIC BLOOD PRESSURE: 130 MMHG | DIASTOLIC BLOOD PRESSURE: 88 MMHG

## 2025-02-09 DIAGNOSIS — J02.9 SORE THROAT: ICD-10-CM

## 2025-02-09 DIAGNOSIS — J02.0 ACUTE STREPTOCOCCAL PHARYNGITIS: Primary | ICD-10-CM

## 2025-02-09 LAB
DEPRECATED S PYO AG THROAT QL EIA: NEGATIVE
FLUAV RNA SPEC QL NAA+PROBE: NEGATIVE
FLUBV RNA RESP QL NAA+PROBE: NEGATIVE
RSV RNA SPEC NAA+PROBE: NEGATIVE
S PYO DNA THROAT QL NAA+PROBE: NOT DETECTED
SARS-COV-2 RNA RESP QL NAA+PROBE: NEGATIVE

## 2025-02-09 PROCEDURE — 87651 STREP A DNA AMP PROBE: CPT | Performed by: FAMILY MEDICINE

## 2025-02-09 PROCEDURE — 87637 SARSCOV2&INF A&B&RSV AMP PRB: CPT | Performed by: FAMILY MEDICINE

## 2025-02-09 RX ORDER — DEXAMETHASONE SODIUM PHOSPHATE 10 MG/ML
10 INJECTION INTRAMUSCULAR; INTRAVENOUS ONCE
Status: COMPLETED | OUTPATIENT
Start: 2025-02-09 | End: 2025-02-09

## 2025-02-09 RX ADMIN — DEXAMETHASONE SODIUM PHOSPHATE 10 MG: 10 INJECTION INTRAMUSCULAR; INTRAVENOUS at 17:00

## 2025-02-10 NOTE — PATIENT INSTRUCTIONS
Decadron given in the office for throat swelling and inflammation. Will last up to 48 hours.     Fluids, diet as tolerated.   Tylenol and ibuprofen as needed for pain    Steam, nasal saline and lozenges for cough and congestion.     Triple swab is pending - we will call

## 2025-02-10 NOTE — PROGRESS NOTES
Assessment/Plan:   1. Sore throat  - Streptococcus A Rapid Screen w/Reflex to PCR - Clinic Collect  - Group A Streptococcus PCR Throat Swab  - Influenza A/B, RSV and SARS-CoV2 PCR (COVID-19) Nasopharyngeal  2. Acute streptococcal pharyngitis (Primary)  - dexAMETHasone (DECADRON) injectable solution used ORALLY 10 mg      I discussed red flag symptoms, return precautions to clinic/ER and follow up care with patient/parent.  Expected clinical course, symptomatic cares advised. Questions answered. Patient/parent amenable with plan.        Subjective:     Brandy Daniel is a 44 year old female who presents ***    Allergies   Allergen Reactions    Cephalexin Hives     Current Outpatient Medications   Medication Sig Dispense Refill    cholecalciferol 25 MCG (1000 UT) TABS       escitalopram (LEXAPRO) 10 MG tablet Take 1 tablet (10 mg) by mouth 2 times daily. 180 tablet 1    phentermine 30 MG capsule TAKE 1 CAPSULE(30 MG) BY MOUTH EVERY MORNING 30 capsule 0    topiramate (TOPAMAX) 50 MG tablet TAKE 1 TABLET(50 MG) BY MOUTH TWICE DAILY 30 tablet 1    acetaminophen (TYLENOL) 325 MG tablet Take 2 tablets (650 mg) by mouth every 6 hours as needed for mild pain or fever (greater than or equal to 38  C /100.4  F (oral) or 38.5  C/ 101.4  F (core).) 1 Bottle 0    ibuprofen (ADVIL/MOTRIN) 200 MG tablet Take 2 tablets (400 mg) by mouth every 6 hours as needed for pain 100 tablet 0    levonorgestrel (MIRENA) 20 MCG/24HR IUD 1 each by Intrauterine route      Multiple Vitamins-Minerals (ONCOVITE) TABS Take 1 tablet by mouth (Patient not taking: Reported on 2/9/2025)      omega 3 1000 MG CAPS Take 2 g by mouth (Patient not taking: Reported on 2/9/2025)      topiramate (TOPAMAX) 50 MG tablet Take 1 tablet (50 mg) by mouth 2 times daily. 120 tablet 1     No current facility-administered medications for this visit.     Patient Active Problem List   Diagnosis    Chondromalacia of patella    Ganglion, joint    Pes planus    Supervision of  normal pregnancy    Hyperemesis gravidarum with dehydration    Encounter for triage in pregnant patient    Indication for care in labor or delivery    Term pregnancy    Spontaneous vaginal delivery    IUD (intrauterine device) in place    Screening for malignant neoplasm of cervix    Chest wall pain    Adjustment disorder with anxious mood       Objective:     /88   Pulse 71   Temp 98.7  F (37.1  C) (Oral)   Resp 14   Wt 63.5 kg (140 lb)   SpO2 100%   BMI 26.02 kg/m      Physical        Results for orders placed or performed in visit on 02/09/25   Streptococcus A Rapid Screen w/Reflex to PCR - Clinic Collect     Status: Normal    Specimen: Throat; Swab   Result Value Ref Range    Group A Strep antigen Negative Negative       This note has been dictated in part using voice recognition software.  Any grammatical or context distortions are unintentional and inherent to the software.  Please feel free to contact me directly for clarification if needed.

## 2025-02-19 PROBLEM — F41.1 GENERALIZED ANXIETY DISORDER: Status: ACTIVE | Noted: 2025-02-19

## 2025-03-11 DIAGNOSIS — E66.811 CLASS 1 OBESITY DUE TO EXCESS CALORIES WITHOUT SERIOUS COMORBIDITY WITH BODY MASS INDEX (BMI) OF 30.0 TO 30.9 IN ADULT: ICD-10-CM

## 2025-03-11 DIAGNOSIS — E66.09 CLASS 1 OBESITY DUE TO EXCESS CALORIES WITHOUT SERIOUS COMORBIDITY WITH BODY MASS INDEX (BMI) OF 30.0 TO 30.9 IN ADULT: ICD-10-CM

## 2025-03-12 RX ORDER — PHENTERMINE HYDROCHLORIDE 30 MG/1
30 CAPSULE ORAL EVERY MORNING
Qty: 30 CAPSULE | Refills: 0 | Status: SHIPPED | OUTPATIENT
Start: 2025-03-12

## 2025-04-01 ASSESSMENT — ANXIETY QUESTIONNAIRES
3. WORRYING TOO MUCH ABOUT DIFFERENT THINGS: SEVERAL DAYS
7. FEELING AFRAID AS IF SOMETHING AWFUL MIGHT HAPPEN: SEVERAL DAYS
8. IF YOU CHECKED OFF ANY PROBLEMS, HOW DIFFICULT HAVE THESE MADE IT FOR YOU TO DO YOUR WORK, TAKE CARE OF THINGS AT HOME, OR GET ALONG WITH OTHER PEOPLE?: NOT DIFFICULT AT ALL
7. FEELING AFRAID AS IF SOMETHING AWFUL MIGHT HAPPEN: SEVERAL DAYS
IF YOU CHECKED OFF ANY PROBLEMS ON THIS QUESTIONNAIRE, HOW DIFFICULT HAVE THESE PROBLEMS MADE IT FOR YOU TO DO YOUR WORK, TAKE CARE OF THINGS AT HOME, OR GET ALONG WITH OTHER PEOPLE: NOT DIFFICULT AT ALL
GAD7 TOTAL SCORE: 11
GAD7 TOTAL SCORE: 11
1. FEELING NERVOUS, ANXIOUS, OR ON EDGE: SEVERAL DAYS
2. NOT BEING ABLE TO STOP OR CONTROL WORRYING: SEVERAL DAYS
GAD7 TOTAL SCORE: 11
6. BECOMING EASILY ANNOYED OR IRRITABLE: NEARLY EVERY DAY
4. TROUBLE RELAXING: MORE THAN HALF THE DAYS
5. BEING SO RESTLESS THAT IT IS HARD TO SIT STILL: MORE THAN HALF THE DAYS

## 2025-04-02 ENCOUNTER — OFFICE VISIT (OUTPATIENT)
Dept: MIDWIFE SERVICES | Facility: CLINIC | Age: 45
End: 2025-04-02
Payer: COMMERCIAL

## 2025-04-02 VITALS
WEIGHT: 145.5 LBS | HEART RATE: 80 BPM | HEIGHT: 62 IN | SYSTOLIC BLOOD PRESSURE: 116 MMHG | BODY MASS INDEX: 26.78 KG/M2 | DIASTOLIC BLOOD PRESSURE: 82 MMHG

## 2025-04-02 DIAGNOSIS — N92.6 IRREGULAR PERIODS: ICD-10-CM

## 2025-04-02 DIAGNOSIS — Z00.00 ROUTINE HEALTH MAINTENANCE: Primary | ICD-10-CM

## 2025-04-02 DIAGNOSIS — R79.9 ELEVATED BLOOD UREA NITROGEN: Primary | ICD-10-CM

## 2025-04-02 DIAGNOSIS — R53.83 OTHER FATIGUE: ICD-10-CM

## 2025-04-02 DIAGNOSIS — Z97.5 IUD (INTRAUTERINE DEVICE) IN PLACE: ICD-10-CM

## 2025-04-02 DIAGNOSIS — Z12.4 SCREENING FOR MALIGNANT NEOPLASM OF CERVIX: ICD-10-CM

## 2025-04-02 PROBLEM — Z34.90 TERM PREGNANCY: Status: RESOLVED | Noted: 2020-04-25 | Resolved: 2025-04-02

## 2025-04-02 PROBLEM — O21.1 HYPEREMESIS GRAVIDARUM WITH DEHYDRATION: Status: RESOLVED | Noted: 2019-09-16 | Resolved: 2025-04-02

## 2025-04-02 PROBLEM — Z36.89 ENCOUNTER FOR TRIAGE IN PREGNANT PATIENT: Status: RESOLVED | Noted: 2020-02-01 | Resolved: 2025-04-02

## 2025-04-02 LAB
ALBUMIN SERPL BCG-MCNC: 4.3 G/DL (ref 3.5–5.2)
ALP SERPL-CCNC: 68 U/L (ref 40–150)
ALT SERPL W P-5'-P-CCNC: 15 U/L (ref 0–50)
ANION GAP SERPL CALCULATED.3IONS-SCNC: 10 MMOL/L (ref 7–15)
AST SERPL W P-5'-P-CCNC: 17 U/L (ref 0–45)
BILIRUB SERPL-MCNC: 0.3 MG/DL
BUN SERPL-MCNC: 25.7 MG/DL (ref 6–20)
CALCIUM SERPL-MCNC: 9.3 MG/DL (ref 8.8–10.4)
CHLORIDE SERPL-SCNC: 104 MMOL/L (ref 98–107)
CREAT SERPL-MCNC: 0.9 MG/DL (ref 0.51–0.95)
EGFRCR SERPLBLD CKD-EPI 2021: 80 ML/MIN/1.73M2
ERYTHROCYTE [DISTWIDTH] IN BLOOD BY AUTOMATED COUNT: 14.1 % (ref 10–15)
ESTRADIOL SERPL-MCNC: 76 PG/ML
FSH SERPL IRP2-ACNC: 6.1 MIU/ML
GLUCOSE SERPL-MCNC: 84 MG/DL (ref 70–99)
HCO3 SERPL-SCNC: 24 MMOL/L (ref 22–29)
HCT VFR BLD AUTO: 40.9 % (ref 35–47)
HGB BLD-MCNC: 13 G/DL (ref 11.7–15.7)
MCH RBC QN AUTO: 28 PG (ref 26.5–33)
MCHC RBC AUTO-ENTMCNC: 31.8 G/DL (ref 31.5–36.5)
MCV RBC AUTO: 88 FL (ref 78–100)
PLATELET # BLD AUTO: 383 10E3/UL (ref 150–450)
POTASSIUM SERPL-SCNC: 4 MMOL/L (ref 3.4–5.3)
PROT SERPL-MCNC: 6.7 G/DL (ref 6.4–8.3)
RBC # BLD AUTO: 4.64 10E6/UL (ref 3.8–5.2)
SODIUM SERPL-SCNC: 138 MMOL/L (ref 135–145)
WBC # BLD AUTO: 8.2 10E3/UL (ref 4–11)

## 2025-04-02 PROCEDURE — 83001 ASSAY OF GONADOTROPIN (FSH): CPT | Performed by: ADVANCED PRACTICE MIDWIFE

## 2025-04-02 PROCEDURE — 99386 PREV VISIT NEW AGE 40-64: CPT | Performed by: ADVANCED PRACTICE MIDWIFE

## 2025-04-02 PROCEDURE — 85027 COMPLETE CBC AUTOMATED: CPT | Performed by: ADVANCED PRACTICE MIDWIFE

## 2025-04-02 PROCEDURE — 99213 OFFICE O/P EST LOW 20 MIN: CPT | Mod: 25 | Performed by: ADVANCED PRACTICE MIDWIFE

## 2025-04-02 PROCEDURE — 36415 COLL VENOUS BLD VENIPUNCTURE: CPT | Performed by: ADVANCED PRACTICE MIDWIFE

## 2025-04-02 PROCEDURE — 3079F DIAST BP 80-89 MM HG: CPT | Performed by: ADVANCED PRACTICE MIDWIFE

## 2025-04-02 PROCEDURE — 82670 ASSAY OF TOTAL ESTRADIOL: CPT | Performed by: ADVANCED PRACTICE MIDWIFE

## 2025-04-02 PROCEDURE — 80053 COMPREHEN METABOLIC PANEL: CPT | Performed by: ADVANCED PRACTICE MIDWIFE

## 2025-04-02 PROCEDURE — 3074F SYST BP LT 130 MM HG: CPT | Performed by: ADVANCED PRACTICE MIDWIFE

## 2025-04-02 NOTE — PATIENT INSTRUCTIONS
"PERIMENOPAUSE    As a field of medical research expands, so does the vocabulary that is used to discuss it.  There are now three terms, defined by the World Health Organization, to describe post-reproductive life.  Menopause is merely a point in time - the first anniversary of a woman's final period.  Perimenopause begins when menstrual cycles become irregular and ends a year after menopause.  Postmenopause starts after perimenopause and runs to the end of life.     Perimenopause is the shorter but more dramatic of the two eras.  Like puberty, it is characterized by uncertainty; no one knows what a \"normal\" perimenopause is.  Although the average duration is 3 to 4 years, it can last only half as long or extend over the better part of a decade.  Although some women have no physical symptoms, others have hot flashes that are disabling.  Although some find that their periods end abruptly, others menstruate erratically for years.    The Role of Estrogen    The physical changes of perimenopause are rooted in hormonal alterations, particularly variations in the level of circulating estrogen.  During our reproductive years, the amount of estrogen in circulation varies throughout the menstrual cycle.  Estrogen levels are controlled to a great degree by two hormones, follicle-stimulating hormone (FSH) and luteinizing hormone (LH), which are released by the pituitary gland when the level of estrogen in the blood is low.  FSH stimulates the growth of follicles - fluid-filled sacs containing an egg; LH tells the ovary to produce more estrogen to spur the release of an egg.  After the egg erupts from the follicle during ovulation, the follicular remnant, known as the corpeus luteum, produces progesterone.  As estrogen and progesterone levels rise, FSH and LH levels drop.    However, by the time we reach our late 30's, the number of eggs remaining in the ovaries declines rapidly, and we are usually ovulating less regularly as we " enter our 40's.  As a result, a cycle may pass without an ovulation, or it may instead result in the release of two eggs - a factor that may account for an increased rate of twins born to women over age 40.    As ovulation becomes more erratic, so do estrogen and progesterone levels.  Scientists believe that surges and drops in estrogen during perimenopause may play an important role in the following conditions when they occur:    *  Heavy periods.  Because ovulation is diminishing, progesterone levels are low to nonexistent.  Without progesterone to regulate the proliferation of the endometrium, the uterine lining may become thicker before it is shed, resulting in heavier periods.    *  Fibroids.  These benign tumors of the uterus wall often grow larger during the perimenopause and are the most common reason for hysterectomy during that period.  Fibroids usually shrink during postmenopause, when estrogen levels decline and remain low.    *  Mood changes.  While erratic hormone levels haven't been established as the cause of mood swings or depression during perimenopause, there is some evidence that they may be a precipitating factor.  Estrogen appears to affect levels of neurotransmitters, the chemical messengers that transfer signals from one brain cell to another.  The loss of estrogen may shift the chemical environment enough to make depression and other mood disorders more apparent.    Perimenopausal Symptoms    The classic symptoms of perimenopause, which are attributed to reductions in circulating estrogen, are listed below.    *  Hot flashes.  Sudden waves of body heat, which may be accompanied by flushing, palpitations, perspiration, or night sweats, are also probably a reflection of hormonal changes.  Estrogen works in the hypothalmus - the part of the brain that regulates the body's heating plant.  As estrogen rises and falls, it acts like an unsteady hand on the thermostat.  Not only hot flashes, sweats,  and chills, but also the more subtle temperature variations that may disrupt sleep, are now believed to result from dips in estrogen.       Hot flashes vary dramatically in intensity and duration among woman who have them.  Some feel only slightly warm, while others emerge wringing wet.  Many women don't have them at all, and a small percentage may continue to experience hot flashes for decades.    *  Vaginal dryness.  The estrogen-sensitive tissues in the vagina may become thinner and drier during perimenopause.  Vaginal dryness can be a source of pain during intercourse and of itching and irritation otherwise.    *  Memory and concentration problems.  Learning and memory are dependent upon a host of cellular and molecular processes, many of which decline with age.  Of course, estrogen is only one of the factors that play a role, but there is mounting - although by no means conclusive - evidence that a reduction in circulating estrogen has an effect on mental function.    Identifying Perimenopause    An elevated level of FSH - a sign that the pituitary is trying in vain to trigger ovulation - was once thought to be a pretty good indication of perimenopause.  However, because FSH levels may vary dramatically during perimenopause, the FSH test is thought to be less reliable than once believed, particularly if it is performed only once.    Therefore, clinicians take other factors into account, including age, menstrual irregularities, and hot flashes and other symptoms, when determining whether a woman is likely to be perimenopausal.  They may also want to rule out pregnancy and other conditions that cause similar symptoms.  Once you're fairly certain that your symptoms are perimenopausal, you may want to consider the following:    *  Estrogen.  If you're still menstruating and aren't interested in getting pregnant, low-dose oral contraceptives can bring levels of circulating estrogen closer to the premenopausal norm.   By suppressing ovulation, they can modulate menstrual flow, regulate periods, and shrink fibroids and endometriosis.    If you haven't had a period for several months, hormone replacement therapy - estrogen and a progestogen - should alleviate most symptoms.  If you've had a hysterectomy, estrogen alone will be sufficient.  Although there is some evidence that estrogen use is associated with an increased risk of developing breast cancer, most studies indicate that risk doesn't begin to mount until after 5 years of estrogen use.  Perimenopause may also be a good time to try estrogen if you're considering using it to reduce health risks that begin to mount after menopause.    *  Other approaches to hot-flash relief.  A few other drugs are occasionally prescribed for hot flashes, including megestrol acetate (Megace), a progestin occasionally used to treat endometrial cancer and hormone-responsive breast cancer; the blood-pressure drugs methyldopa (Aldomet) and clonidine (Catapres); and Bellergal-S, a compound containing phenobarbital and belladonna.    Herbs and other edible plants containing phytoestrogens - compounds that have some effect on estrogen receptors - can also be helpful.  A few early reports have indicated that 50 grams of soy protein daily - approximately the amount in a glass of soy milk - can alleviate hot flashes.  One small clinical trial indicated that black cohosh relieved hot flashes; another has found the same for red clover.  However, the only study conducted with ramone kaur determined that it was no more effective than placebo.    Vitamin E has been studied more extensively, and the results have been conflicting.  As much as 1,200 IV a day worked best but still did not bring dramatic improvement.  Because high doses may cause bleeding or interfere with some drugs, let your clinician know if you plan to use vitamin E.    Biofeedback and stress reduction may reduce the number and severity of hot  flashes.  Both work on the autonomic nervous system, which is responsible for regulating automatic functions like breathing and heart rate.  Because hot flashes are more likely to flare when we're under pressure, techniques that reduce stress can control hot flashes as well.    *  Other approaches to vaginal dryness.  Estrogen cream or ointment can reverse symptoms, but it isn't the only helpful approach.  Continued sexual activity seems to reduce atrophy and helps to maintain the acidic vaginal environment that protects against vaginal infections. Replens, a moisturizer containing polycarbophil, has much the same effect as estrogen: It increases vaginal moisture, elasticity, and acidity after 3 months of use.  Other vaginal lubricants, such as K-Y Jelly, can make intercourse less painful but have no lasting effects.  However, ointments and moisturizers designed for external use aren't effective vaginal lubricants and may even upset the vagina climate.    *  Memory and reasoning.  Although no remedy or technique has been clearly demonstrated to improve memory, estrogen has done so in some studies.    The Bottom Line    Some studies indicate that many women don't think they can do much about symptoms that occur before they stop menstruating.  As a result, they may wait until menopause - enduring the peak months or years of symptoms - before they seek relief.  Of course, it is  nearly always possible to alleviate symptoms as soon as they occur.  Although the road to menopause may be uncharted, it doesn't have to be amol.

## 2025-04-02 NOTE — PROGRESS NOTES
Subjective:     Brandy Daniel is a 45 year old female who presents for an annual exam as well as several concerns. She is an existing [facility] patient but new to [facility] care. Shares feels her family is complete and planning no future pregnancies.  She has 3 children: 15, 12 and 5.  She is partnered and has been in a monogamous relationship x8 years.  She works as an RN in the NICU at the [employer]  Feels like has been relatively stressful recently.  Looking forward to her youngest child going to school.  Hoping to take more time to focus on self care once that happens.    She currently has an IUD in place and does not get periods    Has noticed some symptoms of perimenopause: hot flashes, joint pain, fatigue and irritability.  She is also noticing issues with weight gain.  She is potentially interested in HRT for treatment of her symptoms if she is a good candidate.    Current contraception: Mirena IUD    She is on Lexapro for anxiety, feels it is helpful and wishes to continue.    Healthy Habits:   Exercise: Not currently   Diet: eats regular meals  Safety (seatbelt, gun access, IPV, hx abuse): No concerns  Tobacco/Alcohol/Drug Use: No use  Sexual Partners: monogamous x8 years  Last Breast Exam: Mammogram 2/2023    Colonoscopy:  Due at 47yo  Lipid Profile:  9/5/2023    Immunization History   Administered Date(s) Administered    COVID-19 MONOVALENT 12+ (Pfizer) 12/23/2020, 01/12/2021, 11/09/2021    DT (PEDS <7y) 12/30/1999    DTAP (<7y) 12/30/1999    Flu, Unspecified 10/05/2011, 08/30/2012    Hepatitis B, Peds (Engerix-B/Recombivax HB) 08/10/1998, 09/28/1998, 03/04/1999    Influenza (intradermal) 10/05/2011, 10/10/2012    Influenza Vaccine >6 months,quad, PF 09/12/2016, 10/13/2019, 10/03/2020, 10/09/2021, 10/17/2022    Influenza Vaccine, 6+MO IM (QUADRIVALENT W/PRESERVATIVES) 10/01/2015    MMR (MMRII) 09/01/2012, 04/26/2020    Mantoux Tuberculin Skin Test 12/02/2022    TDAP (Adacel,Boostrix) 04/13/2007,  2023    TDAP Vaccine (Adacel) 2012    TDAP Vaccine (Boostrix) 1999    Td (Adult), Adsorbed 1999    Tdap (Adult) Unspecified Formulation 1999     Immunization status: up to date and documented.    Gynecologic History  No LMP recorded. (Menstrual status: IUD).  Contraception: IUD    Cancer screening:   Last Pap: 2023. Results were:  Normal    Last mammogram: 2023. Results were: Normal      OB History    Para Term  AB Living   4 3 3 0 1 3   SAB IAB Ectopic Multiple Live Births   0 0 0 0 3      # Outcome Date GA Lbr Carmelo/2nd Weight Sex Type Anes PTL Lv   4 Term 20 39w0d 07:24 / 00:10 3.13 kg (6 lb 14.4 oz) F Vag-Spont EPI N NAA      Name: PAULINE,FEMALE-ESTEFANIA      Apgar1: 8  Apgar5: 9   3 Term 12 39w2d 00:40 / 00:34 3.26 kg (7 lb 3 oz) F Vag-Spont EPI, Local N NAA      Name: PAULINE,G1 ESTEFANIA      Apgar1: 8  Apgar5: 9   2 Term  39w0d  3.232 kg (7 lb 2 oz) M   N NAA      Name: Dagoberto   1 AB                Current Outpatient Medications   Medication Sig Dispense Refill    acetaminophen (TYLENOL) 325 MG tablet Take 2 tablets (650 mg) by mouth every 6 hours as needed for mild pain or fever (greater than or equal to 38  C /100.4  F (oral) or 38.5  C/ 101.4  F (core).) 1 Bottle 0    cholecalciferol 25 MCG (1000 UT) TABS Take by mouth daily.      escitalopram (LEXAPRO) 10 MG tablet Take 1 tablet (10 mg) by mouth 2 times daily. 180 tablet 1    ibuprofen (ADVIL/MOTRIN) 200 MG tablet Take 2 tablets (400 mg) by mouth every 6 hours as needed for pain 100 tablet 0    levonorgestrel (MIRENA) 20 MCG/24HR IUD 1 each by Intrauterine route. Mirena IUD placed 2020 at 10 weeks post partum      Multiple Vitamins-Minerals (ONCOVITE) TABS Take 1 tablet by mouth.      phentermine 30 MG capsule TAKE 1 CAPSULE(30 MG) BY MOUTH EVERY MORNING 30 capsule 0    omega 3 1000 MG CAPS Take 2 g by mouth.      topiramate (TOPAMAX) 50 MG tablet Take 1 tablet (50 mg) by mouth 2  times daily. 120 tablet 1    topiramate (TOPAMAX) 50 MG tablet TAKE 1 TABLET(50 MG) BY MOUTH TWICE DAILY 30 tablet 1     Past Medical History:   Diagnosis Date    Abnormal Pap smear     cryo    Anxiety     Disease of thyroid gland     Graves Disease asymptomatic     Past Surgical History:   Procedure Laterality Date    BREAST CYST EXCISION      BREAST SURGERY      DILATION AND CURETTAGE      DILATION AND CURETTAGE      HC SUSPENSION OF BREAST Bilateral 5/4/2018    Procedure: BILATERAL BREAST LIFT WITH BILATERAL IMPLANT EXCHANGE (LEFT RUPTURE) UPGRADE  TO SILICONE IMPLANTS(NON COVERED);  Surgeon: Vera Heard MD;  Location: [facility];  Service: Plastics    LASIK      MAMMOPLASTY AUGMENTATION Bilateral 5/4/2018    Procedure: REPLACEMENT, IMPLANT, BREAST (NON COVERED);  Surgeon: Vera Heard MD;  Location: [facility];  Service:     MAMMOPLASTY AUGMENTATION BILATERAL      MAMMOPLASTY REDUCTION BILATERAL  05/2018    REFRACTIVE SURGERY Bilateral     US BREAST CORE BIOPSY LEFT Left 12/10/2020    WISDOM TOOTH EXTRACTION       Cephalexin  No family history on file.  Social History     Socioeconomic History    Marital status:      Spouse name: Not on file    Number of children: Not on file    Years of education: Not on file    Highest education level: Not on file   Occupational History    Not on file   Tobacco Use    Smoking status: Never     Passive exposure: Never    Smokeless tobacco: Never   Vaping Use    Vaping status: Never Used   Substance and Sexual Activity    Alcohol use: Yes     Alcohol/week: 2.0 standard drinks of alcohol    Drug use: No    Sexual activity: Yes     Partners: Male     Birth control/protection: I.U.D.     Comment: mirena IUD since July 2020   Other Topics Concern    Not on file   Social History Narrative    Not on file     Social Drivers of Health     Financial Resource Strain: Low Risk  (9/27/2024)    Financial Resource Strain     Within the past 12 months,  have you or your family members you live with been unable to get utilities (heat, electricity) when it was really needed?: No   Food Insecurity: Low Risk  (9/27/2024)    Food Insecurity     Within the past 12 months, did you worry that your food would run out before you got money to buy more?: No     Within the past 12 months, did the food you bought just not last and you didn t have money to get more?: No   Transportation Needs: Low Risk  (9/27/2024)    Transportation Needs     Within the past 12 months, has lack of transportation kept you from medical appointments, getting your medicines, non-medical meetings or appointments, work, or from getting things that you need?: No   Physical Activity: Unknown (9/27/2024)    Exercise Vital Sign     Days of Exercise per Week: 1 day     Minutes of Exercise per Session: Not on file   Stress: Stress Concern Present (9/27/2024)    Citizen of [place] of Occupational [employer] - Occupational Stress Questionnaire     Feeling of Stress : Rather much   Social Connections: Unknown (9/27/2024)    Social Connection and Isolation Panel [NHANES]     Frequency of Communication with Friends and Family: Not on file     Frequency of Social Gatherings with Friends and Family: Once a week     Attends Religion Services: Not on file     Active Member of Clubs or Organizations: Not on file     Attends Club or Organization Meetings: Not on file     Marital Status: Not on file   Interpersonal Safety: Low Risk  (4/2/2025)    Interpersonal Safety     Do you feel physically and emotionally safe where you currently live?: Yes     Within the past 12 months, have you been hit, slapped, kicked or otherwise physically hurt by someone?: No     Within the past 12 months, have you been humiliated or emotionally abused in other ways by your partner or ex-partner?: No   Housing Stability: Low Risk  (9/27/2024)    Housing Stability     Do you have housing? : Yes     Are you worried about losing your housing?: No  "      Review of Systems  General:  Denies problem  Eyes: Denies problem  Ears/Nose/Throat: Denies problem  Cardiovascular: Denies problem  Respiratory:  Denies problem  Gastrointestinal:  denies problems  Genitourinary: denies problems  Musculoskeletal:  Joint pain  Skin: Denies problem  Neurologic:denies problems  Psychiatric:  Lexapro for anxiety, feels like it is helpful, denies current concerns  Endocrine:  Hot flashes, fatigue, weight gain, irritability      Objective:      Vitals:    04/02/25 0919   BP: 116/82   Pulse: 80   Weight: 66 kg (145 lb 8 oz)   Height: 1.562 m (5' 1.5\")       Physical Exam:  General Appearance: Alert, cooperative, no distress, appears stated age  Skin: Skin color, texture, turgor normal, no rashes or lesions  Throat: Lips, mucosa, and tongue normal; teeth and gums normal  HEENT: grossly normal; otoscopic and opthalmic exam not performed.   Neck: Supple, symmetrical, trachea midline, no adenopathy;  thyroid: not enlarged, symmetric, no tenderness/mass/nodules  Lungs: Clear to auscultation bilaterally, respirations unlabored  Breasts: No breast masses, tenderness, asymmetry, or nipple discharge   Heart: Regular rate and rhythm, S1 and S2 normal, no murmur, rub, or gallop  Abdomen: Soft, non-tender. No organomegaly or masses  Pelvic:Deferred, pt denies concerns, not due for pap smear    Assessment:     Healthy female exam.  Perimenopause     Plan:      1. Discussed nutrition and exercise  2.  Labs as ordered  3. Breast awareness reviewed and patient encouraged to contact her provider with concerns. Up to date on mammogram screening      8/30/2023     2:12 PM   Breast CA Risk Assessment (FHS-7)   Do you have a family history of breast, colon, or ovarian cancer? No / Unknown     Pertinent mammograms are reviewed under the imaging tab.  4. Contraception: IUD--Mirena  5. Pap smear not done at today's visit.   6. Once labs are resulted contact re: HRT if appropriate.    15 minutes on the date " of the encounter outside of annual exam spent on chart review, face to face with patient and decision making/documentation re: perimenopause, fatigue, weight gain.    RUFINA Arreaga, ASAD      Answers submitted by the patient for this visit:  Patient Health Questionnaire (G7) (Submitted on 4/1/2025)  RACHEL 7 TOTAL SCORE: 11

## 2025-04-08 ENCOUNTER — MYC MEDICAL ADVICE (OUTPATIENT)
Dept: MIDWIFE SERVICES | Facility: CLINIC | Age: 45
End: 2025-04-08
Payer: COMMERCIAL

## 2025-04-08 DIAGNOSIS — N95.1 MENOPAUSAL SYNDROME (HOT FLASHES): Primary | ICD-10-CM

## 2025-04-09 PROBLEM — N95.1 MENOPAUSAL SYNDROME (HOT FLASHES): Status: ACTIVE | Noted: 2025-04-09

## 2025-04-09 RX ORDER — ESTRADIOL 0.03 MG/D
1 FILM, EXTENDED RELEASE TRANSDERMAL
Qty: 32 PATCH | Refills: 0 | Status: SHIPPED | OUTPATIENT
Start: 2025-04-10

## 2025-04-09 NOTE — TELEPHONE ENCOUNTER
[facility] rx request: Requesting estrogen therapy for perimenopause symptoms. Review of visit on 4/2/25 with CARI Cordero, pt reports Mirena IUD in place since 2020 with amenorrhea. Current symptoms include Hot Flashes, irritability, fatigue and weight gain. Recent labs: TSH/T4 wnl, Lipids slightly elevated, estradiol 76, FSH 6. No report of personal history blood clot, breast cancer, ROMAN Score is < 5 (3.04), ASCVD score < 5% (0.7%). Based on pt conversation with provider in clinic, will plan initiate ET for symptoms in early perimenopause. Given written information on risks and benefits of hormone therapy and advised on administration dosing and possible side effects of initiation. Advised on need for in person follow up visit for dose adjustment, side effect review and repeat CMP. Rx vivelle dot 0.025 mg q 96 hours (twice weekly patch). Mirena IUD in place for progesterone endometrium protection. Needs in person visit before refill.

## 2025-04-29 ENCOUNTER — OFFICE VISIT (OUTPATIENT)
Dept: FAMILY MEDICINE | Facility: CLINIC | Age: 45
End: 2025-04-29
Payer: COMMERCIAL

## 2025-04-29 VITALS
SYSTOLIC BLOOD PRESSURE: 130 MMHG | BODY MASS INDEX: 27.05 KG/M2 | HEART RATE: 76 BPM | OXYGEN SATURATION: 99 % | WEIGHT: 147 LBS | DIASTOLIC BLOOD PRESSURE: 86 MMHG | RESPIRATION RATE: 16 BRPM | HEIGHT: 62 IN

## 2025-04-29 DIAGNOSIS — E66.09 CLASS 1 OBESITY DUE TO EXCESS CALORIES WITHOUT SERIOUS COMORBIDITY WITH BODY MASS INDEX (BMI) OF 30.0 TO 30.9 IN ADULT: Primary | ICD-10-CM

## 2025-04-29 DIAGNOSIS — E66.811 CLASS 1 OBESITY DUE TO EXCESS CALORIES WITHOUT SERIOUS COMORBIDITY WITH BODY MASS INDEX (BMI) OF 30.0 TO 30.9 IN ADULT: Primary | ICD-10-CM

## 2025-04-29 LAB
ALBUMIN SERPL BCG-MCNC: 4.1 G/DL (ref 3.5–5.2)
ALP SERPL-CCNC: 64 U/L (ref 40–150)
ALT SERPL W P-5'-P-CCNC: 17 U/L (ref 0–50)
ANION GAP SERPL CALCULATED.3IONS-SCNC: 8 MMOL/L (ref 7–15)
AST SERPL W P-5'-P-CCNC: 19 U/L (ref 0–45)
BILIRUB SERPL-MCNC: 0.2 MG/DL
BUN SERPL-MCNC: 16.8 MG/DL (ref 6–20)
CALCIUM SERPL-MCNC: 9.3 MG/DL (ref 8.8–10.4)
CHLORIDE SERPL-SCNC: 108 MMOL/L (ref 98–107)
CREAT SERPL-MCNC: 0.89 MG/DL (ref 0.51–0.95)
EGFRCR SERPLBLD CKD-EPI 2021: 82 ML/MIN/1.73M2
GLUCOSE SERPL-MCNC: 99 MG/DL (ref 70–99)
HCO3 SERPL-SCNC: 24 MMOL/L (ref 22–29)
POTASSIUM SERPL-SCNC: 4.3 MMOL/L (ref 3.4–5.3)
PROT SERPL-MCNC: 6.6 G/DL (ref 6.4–8.3)
SODIUM SERPL-SCNC: 140 MMOL/L (ref 135–145)

## 2025-04-29 PROCEDURE — 36415 COLL VENOUS BLD VENIPUNCTURE: CPT

## 2025-04-29 PROCEDURE — 84155 ASSAY OF PROTEIN SERUM: CPT

## 2025-04-29 PROCEDURE — 3075F SYST BP GE 130 - 139MM HG: CPT

## 2025-04-29 PROCEDURE — 99213 OFFICE O/P EST LOW 20 MIN: CPT

## 2025-04-29 PROCEDURE — 3079F DIAST BP 80-89 MM HG: CPT

## 2025-04-29 RX ORDER — TOPIRAMATE 50 MG/1
50 TABLET, FILM COATED ORAL 2 TIMES DAILY
Qty: 120 TABLET | Refills: 1 | Status: SHIPPED | OUTPATIENT
Start: 2025-04-29

## 2025-04-29 NOTE — PROGRESS NOTES
"  Assessment & Plan     Class 1 obesity due to excess calories without serious comorbidity with body mass index (BMI) of 30.0 to 30.9 in adult  Patient with slightly elevated BP and no longer experiencing weight loss on phentermine, which she has been on since approximately December 2024, approximately 4 months.  Discussed available weight loss medications, shared decision making we will continue on topiramate and start patient on Contrave.  Side effects of this medication including rare side effect of seizures discussed.  Patient to send [employer] message with update in 1 month and will represcribe Contrave at maintenance dose (2 tablets twice daily) and patient to follow-up in person in 3 months.  - Comprehensive metabolic panel; Future  - naltrexone-bupropion (CONTRAVE) 8-90 MG per 12 hr tablet; Week 1: 1 tablet by mouth in AM; Week 2: 1 tablet twice daily (AM and early PM); Week 3: 2 tablets in AM, 1 tablet in early PM; Week 4 & on: 2 tablets twice daily (AM and early PM).  - topiramate (TOPAMAX) 50 MG tablet; Take 1 tablet (50 mg) by mouth 2 times daily.  - Comprehensive metabolic panel      BMI  Estimated body mass index is 27.33 kg/m  as calculated from the following:    Height as of this encounter: 1.562 m (5' 1.5\").    Weight as of this encounter: 66.7 kg (147 lb).           If contrave not covered plan to prescribe bupropion alone. [email or web address] message in 1 month to report how first month of dosing went and follow up in 3 months.    Randolph Nava is a 45 year old, presenting for the following health issues:  Medication Refill (Phentermine)      4/29/2025     9:08 AM   Additional Questions   Roomed by laith neumann   Accompanied by self     Via the Health Maintenance questionnaire, the patient has reported the following services have been completed -Mammogram: [facility] 2024-01-01, this information has not been sent to the abstraction team.  Medication Refill  This is a new problem. The current episode " "started today. The problem occurs daily.   History of Present Illness       Reason for visit:  Med refill    She eats 0-1 servings of fruits and vegetables daily.She consumes 0 sweetened beverage(s) daily.She exercises with enough effort to increase her heart rate 9 or less minutes per day.  She exercises with enough effort to increase her heart rate 3 or less days per week.   She is taking medications regularly.                      Objective    /86 (BP Location: Right arm, Patient Position: Sitting)   Pulse 76   Resp 16   Ht 1.562 m (5' 1.5\")   Wt 66.7 kg (147 lb)   LMP  (LMP Unknown)   SpO2 99%   Breastfeeding No   BMI 27.33 kg/m    Body mass index is 27.33 kg/m .  Physical Exam     Physical Exam  Constitutional:       Appearance: Normal appearance.   HENT:      Head: Normocephalic.      Mouth/Throat:      Mouth: Mucous membranes are moist.   Eyes:      Extraocular Movements: Extraocular movements intact.      Conjunctiva/sclera: Conjunctivae normal.   Cardiovascular:      Rate and Rhythm: Normal rate.   Pulmonary:      Effort: Pulmonary effort is normal. No respiratory distress.   Skin:     General: Skin is warm and dry.      Capillary Refill: Capillary refill takes less than 2 seconds.   Neurological:      Mental Status: She is alert and oriented to person, place, and time.   Psychiatric:         Behavior: Behavior normal.         Thought Content: Thought content normal.               Signed Electronically by: RUFINA Naqvi CNP    "

## 2025-05-02 ENCOUNTER — HOSPITAL ENCOUNTER (OUTPATIENT)
Dept: MAMMOGRAPHY | Facility: CLINIC | Age: 45
Discharge: HOME | End: 2025-05-02
Attending: OBSTETRICS & GYNECOLOGY | Admitting: OBSTETRICS & GYNECOLOGY
Payer: COMMERCIAL

## 2025-05-02 DIAGNOSIS — Z12.31 VISIT FOR SCREENING MAMMOGRAM: ICD-10-CM

## 2025-05-02 PROCEDURE — 77063 BREAST TOMOSYNTHESIS BI: CPT

## 2025-05-02 PROCEDURE — 77067 SCR MAMMO BI INCL CAD: CPT

## 2025-05-06 ENCOUNTER — TELEPHONE (OUTPATIENT)
Dept: FAMILY MEDICINE | Facility: CLINIC | Age: 45
End: 2025-05-06
Payer: COMMERCIAL

## 2025-05-06 DIAGNOSIS — E66.09 CLASS 1 OBESITY DUE TO EXCESS CALORIES WITHOUT SERIOUS COMORBIDITY WITH BODY MASS INDEX (BMI) OF 30.0 TO 30.9 IN ADULT: Primary | ICD-10-CM

## 2025-05-06 DIAGNOSIS — E66.811 CLASS 1 OBESITY DUE TO EXCESS CALORIES WITHOUT SERIOUS COMORBIDITY WITH BODY MASS INDEX (BMI) OF 30.0 TO 30.9 IN ADULT: Primary | ICD-10-CM

## 2025-05-06 RX ORDER — BUPROPION HYDROCHLORIDE 75 MG/1
TABLET ORAL
Qty: 113 TABLET | Refills: 0 | Status: SHIPPED | OUTPATIENT
Start: 2025-05-06 | End: 2025-07-05

## 2025-05-06 NOTE — TELEPHONE ENCOUNTER
Medication Question or Refill    Contacts       Contact Date/Time Type Contact Phone/Fax    05/06/2025 01:05 PM CDT Phone (Incoming) Brandy Daniel (Self) [phone] (M)            What medication are you calling about (include dose and sig)?: Pt called in and wanted to let Brandy Granado know that prescription Contrave is not covered by insurance. Pt asked if she could call a new prescription in that may be covered.     Preferred Pharmacy:       [facility] [address]  [address]  [facility] [address]  [facility] [address]  Phone: [phone] Fax: [phone] Alternate Fax: [phone], [phone], [phone]      Controlled Substance Agreement on file:   CSA -- Patient Level:    CSA: None found at the patient level.       Who prescribed the medication?: Brandy Granado       Do you have any questions or concerns?  Yes:Pt called in and wanted to let Brandy Granado know that prescription Contrave is not covered by insurance. Pt asked if she could call a new prescription in that may be covered.      Could we send this information to you in [employer] or would you prefer to receive a phone call?:   Patient would like to be contacted via [employer]

## 2025-05-29 ENCOUNTER — VIRTUAL VISIT (OUTPATIENT)
Dept: MIDWIFE SERVICES | Facility: CLINIC | Age: 45
End: 2025-05-29
Payer: COMMERCIAL

## 2025-05-29 DIAGNOSIS — N95.1 MENOPAUSAL SYNDROME (HOT FLASHES): Primary | ICD-10-CM

## 2025-05-29 RX ORDER — ESTRADIOL 0.5 MG/1
0.5 TABLET ORAL DAILY
Qty: 30 TABLET | Refills: 2 | Status: SHIPPED | OUTPATIENT
Start: 2025-05-29

## 2025-05-29 NOTE — PROGRESS NOTES
"Brandy is a 45 year old who is being evaluated via a billable telephone visit.    What phone number would you like to be contacted at? [phone]  How would you like to obtain your AVS? Charline  Originating Location (pt. Location): Home    Distant Location (provider location):  On-site  Telephone visit completed due to the patient did not consent to a video visit.Has a cold. Could not come to in person visit.    Assessment & Plan     Menopausal syndrome (hot flashes)    - estradiol (ESTRACE) 0.5 MG tablet; Take 1 tablet (0.5 mg) by mouth daily. After stopping patch.  Patient currently has Mirena IUD for progesterone endometrium protection.  Follow-up in 2-3 months        BMI  Estimated body mass index is 27.33 kg/m  as calculated from the following:    Height as of 4/29/25: 1.562 m (5' 1.5\").    Weight as of 4/29/25: 66.7 kg (147 lb).         RUFINA Jones CNM      Subjective   Brandy is a 45 year old, presenting for the following health issues:  Follow Up (Perimenopause.  Estrogen patch)    HPI      Brandy currently has a cold so she made her visit into a telephone visit.  2 patient identifiers used.  She has noticed a better mood, she is no longer having hot flashes, she has less fatigue, and has had stable a weight. Sleeping well with unisom nightly, does not desire alternative medication.  She would like to change from patch to pills that she takes pills every day and is worried that she will not remember to change the patch.  No other concerns.      Review of Systems  Constitutional, HEENT, cardiovascular, pulmonary, gi and gu systems are negative, except as otherwise noted.      Objective    Vitals - Patient Reported  Systolic (Patient Reported):  (not taken)  Diastolic (Patient Reported):  (not taken)  Weight (Patient Reported):  (not taken)  Height (Patient Reported): 156.2 cm (5' 1.5\")        Physical Exam   General: Alert and no distress //Respiratory: No audible wheeze, cough, or shortness of " breath // Psychiatric:  Appropriate affect, tone, and pace of words            Phone call duration: 7 minutes  Signed Electronically by: RUFINA Jones CNM

## 2025-07-05 DIAGNOSIS — E66.09 CLASS 1 OBESITY DUE TO EXCESS CALORIES WITHOUT SERIOUS COMORBIDITY WITH BODY MASS INDEX (BMI) OF 30.0 TO 30.9 IN ADULT: ICD-10-CM

## 2025-07-05 DIAGNOSIS — E66.811 CLASS 1 OBESITY DUE TO EXCESS CALORIES WITHOUT SERIOUS COMORBIDITY WITH BODY MASS INDEX (BMI) OF 30.0 TO 30.9 IN ADULT: ICD-10-CM

## 2025-07-08 RX ORDER — BUPROPION HYDROCHLORIDE 150 MG/1
150 TABLET ORAL EVERY MORNING
Qty: 90 TABLET | Refills: 3 | Status: SHIPPED | OUTPATIENT
Start: 2025-07-08

## 2025-08-03 DIAGNOSIS — F41.9 ANXIETY: ICD-10-CM

## 2025-08-04 RX ORDER — ESCITALOPRAM OXALATE 10 MG/1
10 TABLET ORAL 2 TIMES DAILY
Qty: 180 TABLET | Refills: 1 | Status: SHIPPED | OUTPATIENT
Start: 2025-08-04

## 2025-09-02 ENCOUNTER — MYC MEDICAL ADVICE (OUTPATIENT)
Dept: OBGYN | Facility: CLINIC | Age: 45
End: 2025-09-02
Payer: COMMERCIAL

## 2025-09-02 DIAGNOSIS — N95.1 MENOPAUSAL SYNDROME (HOT FLASHES): ICD-10-CM

## 2025-09-02 RX ORDER — ESTRADIOL 0.5 MG/1
TABLET ORAL
Qty: 30 TABLET | Refills: 2 | Status: SHIPPED | OUTPATIENT
Start: 2025-09-02